# Patient Record
Sex: FEMALE | Race: WHITE | NOT HISPANIC OR LATINO | Employment: PART TIME | URBAN - NONMETROPOLITAN AREA
[De-identification: names, ages, dates, MRNs, and addresses within clinical notes are randomized per-mention and may not be internally consistent; named-entity substitution may affect disease eponyms.]

---

## 2024-08-09 ENCOUNTER — HOSPITAL ENCOUNTER (EMERGENCY)
Facility: HOSPITAL | Age: 34
Discharge: PSYCHIATRIC OR SUBSTANCE USE FACILITY | DRG: 897 | End: 2024-08-09
Attending: STUDENT IN AN ORGANIZED HEALTH CARE EDUCATION/TRAINING PROGRAM
Payer: MEDICAID

## 2024-08-09 ENCOUNTER — HOSPITAL ENCOUNTER (INPATIENT)
Facility: HOSPITAL | Age: 34
LOS: 5 days | Discharge: HOME | DRG: 897 | End: 2024-08-14
Attending: PSYCHIATRY & NEUROLOGY | Admitting: PSYCHIATRY & NEUROLOGY
Payer: MEDICAID

## 2024-08-09 VITALS
WEIGHT: 168 LBS | TEMPERATURE: 97.9 F | RESPIRATION RATE: 18 BRPM | SYSTOLIC BLOOD PRESSURE: 150 MMHG | OXYGEN SATURATION: 100 % | BODY MASS INDEX: 30.91 KG/M2 | DIASTOLIC BLOOD PRESSURE: 114 MMHG | HEIGHT: 62 IN | HEART RATE: 90 BPM

## 2024-08-09 DIAGNOSIS — F10.10 ALCOHOL ABUSE: Primary | ICD-10-CM

## 2024-08-09 PROBLEM — F19.20 CHEMICAL DEPENDENCY: Status: ACTIVE | Noted: 2024-08-09

## 2024-08-09 LAB
ALBUMIN SERPL-MCNC: 4.2 G/DL (ref 3.5–5.2)
ALBUMIN/GLOB SERPL: 1.2 G/DL
ALP SERPL-CCNC: 114 U/L (ref 39–117)
ALT SERPL W P-5'-P-CCNC: 22 U/L (ref 1–33)
AMPHET+METHAMPHET UR QL: NEGATIVE
AMPHETAMINES UR QL: NEGATIVE
ANION GAP SERPL CALCULATED.3IONS-SCNC: 16 MMOL/L (ref 5–15)
AST SERPL-CCNC: 61 U/L (ref 1–32)
B-HCG UR QL: NEGATIVE
BACTERIA UR QL AUTO: ABNORMAL /HPF
BARBITURATES UR QL SCN: NEGATIVE
BASOPHILS # BLD AUTO: 0.03 10*3/MM3 (ref 0–0.2)
BASOPHILS NFR BLD AUTO: 0.5 % (ref 0–1.5)
BENZODIAZ UR QL SCN: NEGATIVE
BILIRUB SERPL-MCNC: 0.8 MG/DL (ref 0–1.2)
BILIRUB UR QL STRIP: NEGATIVE
BUN SERPL-MCNC: 9 MG/DL (ref 6–20)
BUN/CREAT SERPL: 18.8 (ref 7–25)
BUPRENORPHINE SERPL-MCNC: POSITIVE NG/ML
CALCIUM SPEC-SCNC: 8.6 MG/DL (ref 8.6–10.5)
CANNABINOIDS SERPL QL: NEGATIVE
CHLORIDE SERPL-SCNC: 105 MMOL/L (ref 98–107)
CLARITY UR: CLEAR
CO2 SERPL-SCNC: 23 MMOL/L (ref 22–29)
COCAINE UR QL: NEGATIVE
COLOR UR: ABNORMAL
CREAT SERPL-MCNC: 0.48 MG/DL (ref 0.57–1)
DEPRECATED RDW RBC AUTO: 63.7 FL (ref 37–54)
EGFRCR SERPLBLD CKD-EPI 2021: 126.1 ML/MIN/1.73
EOSINOPHIL # BLD AUTO: 0.07 10*3/MM3 (ref 0–0.4)
EOSINOPHIL NFR BLD AUTO: 1.1 % (ref 0.3–6.2)
ERYTHROCYTE [DISTWIDTH] IN BLOOD BY AUTOMATED COUNT: 15 % (ref 12.3–15.4)
ETHANOL BLD-MCNC: 244 MG/DL (ref 0–10)
ETHANOL BLD-MCNC: 67 MG/DL (ref 0–10)
ETHANOL UR QL: 0.07 %
ETHANOL UR QL: 0.24 %
FENTANYL UR-MCNC: NEGATIVE NG/ML
GLOBULIN UR ELPH-MCNC: 3.6 GM/DL
GLUCOSE SERPL-MCNC: 118 MG/DL (ref 65–99)
GLUCOSE UR STRIP-MCNC: NEGATIVE MG/DL
HCT VFR BLD AUTO: 36 % (ref 34–46.6)
HGB BLD-MCNC: 12.6 G/DL (ref 12–15.9)
HGB UR QL STRIP.AUTO: NEGATIVE
HOLD SPECIMEN: NORMAL
HOLD SPECIMEN: NORMAL
HYALINE CASTS UR QL AUTO: ABNORMAL /LPF
IMM GRANULOCYTES # BLD AUTO: 0.01 10*3/MM3 (ref 0–0.05)
IMM GRANULOCYTES NFR BLD AUTO: 0.2 % (ref 0–0.5)
KETONES UR QL STRIP: NEGATIVE
LEUKOCYTE ESTERASE UR QL STRIP.AUTO: ABNORMAL
LYMPHOCYTES # BLD AUTO: 2.08 10*3/MM3 (ref 0.7–3.1)
LYMPHOCYTES NFR BLD AUTO: 33 % (ref 19.6–45.3)
MAGNESIUM SERPL-MCNC: 2.1 MG/DL (ref 1.6–2.6)
MCH RBC QN AUTO: 40 PG (ref 26.6–33)
MCHC RBC AUTO-ENTMCNC: 35 G/DL (ref 31.5–35.7)
MCV RBC AUTO: 114.3 FL (ref 79–97)
METHADONE UR QL SCN: NEGATIVE
MONOCYTES # BLD AUTO: 0.43 10*3/MM3 (ref 0.1–0.9)
MONOCYTES NFR BLD AUTO: 6.8 % (ref 5–12)
NEUTROPHILS NFR BLD AUTO: 3.69 10*3/MM3 (ref 1.7–7)
NEUTROPHILS NFR BLD AUTO: 58.4 % (ref 42.7–76)
NITRITE UR QL STRIP: NEGATIVE
NRBC BLD AUTO-RTO: 0 /100 WBC (ref 0–0.2)
OPIATES UR QL: NEGATIVE
OXYCODONE UR QL SCN: NEGATIVE
PCP UR QL SCN: NEGATIVE
PH UR STRIP.AUTO: 6 [PH] (ref 5–8)
PLATELET # BLD AUTO: 140 10*3/MM3 (ref 140–450)
PMV BLD AUTO: 10.7 FL (ref 6–12)
POTASSIUM SERPL-SCNC: 3.3 MMOL/L (ref 3.5–5.2)
PROT SERPL-MCNC: 7.8 G/DL (ref 6–8.5)
PROT UR QL STRIP: ABNORMAL
RBC # BLD AUTO: 3.15 10*6/MM3 (ref 3.77–5.28)
RBC # UR STRIP: ABNORMAL /HPF
REF LAB TEST METHOD: ABNORMAL
SODIUM SERPL-SCNC: 144 MMOL/L (ref 136–145)
SP GR UR STRIP: 1.02 (ref 1–1.03)
SQUAMOUS #/AREA URNS HPF: ABNORMAL /HPF
TRICYCLICS UR QL SCN: NEGATIVE
UROBILINOGEN UR QL STRIP: ABNORMAL
WBC # UR STRIP: ABNORMAL /HPF
WBC NRBC COR # BLD AUTO: 6.31 10*3/MM3 (ref 3.4–10.8)
WHOLE BLOOD HOLD COAG: NORMAL
WHOLE BLOOD HOLD SPECIMEN: NORMAL

## 2024-08-09 PROCEDURE — 83735 ASSAY OF MAGNESIUM: CPT | Performed by: PHYSICIAN ASSISTANT

## 2024-08-09 PROCEDURE — 36415 COLL VENOUS BLD VENIPUNCTURE: CPT

## 2024-08-09 PROCEDURE — 81001 URINALYSIS AUTO W/SCOPE: CPT | Performed by: PHYSICIAN ASSISTANT

## 2024-08-09 PROCEDURE — 87086 URINE CULTURE/COLONY COUNT: CPT | Performed by: PHYSICIAN ASSISTANT

## 2024-08-09 PROCEDURE — 80307 DRUG TEST PRSMV CHEM ANLYZR: CPT | Performed by: PHYSICIAN ASSISTANT

## 2024-08-09 PROCEDURE — 25010000002 THIAMINE HCL 200 MG/2ML SOLUTION: Performed by: PHYSICIAN ASSISTANT

## 2024-08-09 PROCEDURE — 25810000003 SODIUM CHLORIDE 0.9 % SOLUTION: Performed by: PHYSICIAN ASSISTANT

## 2024-08-09 PROCEDURE — 80053 COMPREHEN METABOLIC PANEL: CPT | Performed by: PHYSICIAN ASSISTANT

## 2024-08-09 PROCEDURE — 96375 TX/PRO/DX INJ NEW DRUG ADDON: CPT

## 2024-08-09 PROCEDURE — 87077 CULTURE AEROBIC IDENTIFY: CPT | Performed by: PHYSICIAN ASSISTANT

## 2024-08-09 PROCEDURE — 96365 THER/PROPH/DIAG IV INF INIT: CPT

## 2024-08-09 PROCEDURE — 87186 SC STD MICRODIL/AGAR DIL: CPT | Performed by: PHYSICIAN ASSISTANT

## 2024-08-09 PROCEDURE — 93005 ELECTROCARDIOGRAM TRACING: CPT | Performed by: PHYSICIAN ASSISTANT

## 2024-08-09 PROCEDURE — 99285 EMERGENCY DEPT VISIT HI MDM: CPT

## 2024-08-09 PROCEDURE — 82077 ASSAY SPEC XCP UR&BREATH IA: CPT | Performed by: PHYSICIAN ASSISTANT

## 2024-08-09 PROCEDURE — 85025 COMPLETE CBC W/AUTO DIFF WBC: CPT | Performed by: PHYSICIAN ASSISTANT

## 2024-08-09 PROCEDURE — 81025 URINE PREGNANCY TEST: CPT | Performed by: PHYSICIAN ASSISTANT

## 2024-08-09 PROCEDURE — 25010000002 ONDANSETRON PER 1 MG: Performed by: PHYSICIAN ASSISTANT

## 2024-08-09 RX ORDER — NICOTINE 21 MG/24HR
1 PATCH, TRANSDERMAL 24 HOURS TRANSDERMAL ONCE
Status: DISCONTINUED | OUTPATIENT
Start: 2024-08-09 | End: 2024-08-09 | Stop reason: HOSPADM

## 2024-08-09 RX ORDER — SODIUM CHLORIDE 9 MG/ML
1000 INJECTION, SOLUTION INTRAVENOUS ONCE
Status: COMPLETED | OUTPATIENT
Start: 2024-08-09 | End: 2024-08-09

## 2024-08-09 RX ORDER — LORAZEPAM 1 MG/1
1 TABLET ORAL
Status: ACTIVE | OUTPATIENT
Start: 2024-08-12 | End: 2024-08-13

## 2024-08-09 RX ORDER — LORAZEPAM 2 MG/1
2 TABLET ORAL ONCE
Status: COMPLETED | OUTPATIENT
Start: 2024-08-10 | End: 2024-08-10

## 2024-08-09 RX ORDER — TRAZODONE HYDROCHLORIDE 50 MG/1
50 TABLET ORAL NIGHTLY PRN
Status: DISCONTINUED | OUTPATIENT
Start: 2024-08-09 | End: 2024-08-14 | Stop reason: HOSPADM

## 2024-08-09 RX ORDER — ECHINACEA PURPUREA EXTRACT 125 MG
2 TABLET ORAL AS NEEDED
Status: DISCONTINUED | OUTPATIENT
Start: 2024-08-09 | End: 2024-08-14 | Stop reason: HOSPADM

## 2024-08-09 RX ORDER — FAMOTIDINE 20 MG/1
20 TABLET, FILM COATED ORAL 2 TIMES DAILY PRN
Status: DISCONTINUED | OUTPATIENT
Start: 2024-08-09 | End: 2024-08-14 | Stop reason: HOSPADM

## 2024-08-09 RX ORDER — LORAZEPAM 0.5 MG/1
0.5 TABLET ORAL
Status: COMPLETED | OUTPATIENT
Start: 2024-08-13 | End: 2024-08-13

## 2024-08-09 RX ORDER — LORAZEPAM 2 MG/1
2 TABLET ORAL
Status: ACTIVE | OUTPATIENT
Start: 2024-08-09 | End: 2024-08-10

## 2024-08-09 RX ORDER — MULTIVITAMIN WITH IRON
2 TABLET ORAL DAILY
Status: DISCONTINUED | OUTPATIENT
Start: 2024-08-10 | End: 2024-08-14 | Stop reason: HOSPADM

## 2024-08-09 RX ORDER — POLYETHYLENE GLYCOL 3350 17 G/17G
17 POWDER, FOR SOLUTION ORAL DAILY PRN
Status: DISCONTINUED | OUTPATIENT
Start: 2024-08-09 | End: 2024-08-14 | Stop reason: HOSPADM

## 2024-08-09 RX ORDER — CEFDINIR 300 MG/1
300 CAPSULE ORAL ONCE
Status: COMPLETED | OUTPATIENT
Start: 2024-08-09 | End: 2024-08-09

## 2024-08-09 RX ORDER — BUPRENORPHINE HYDROCHLORIDE AND NALOXONE HYDROCHLORIDE DIHYDRATE 8; 2 MG/1; MG/1
2 TABLET SUBLINGUAL DAILY
COMMUNITY

## 2024-08-09 RX ORDER — BENZTROPINE MESYLATE 1 MG/ML
1 INJECTION INTRAMUSCULAR; INTRAVENOUS ONCE AS NEEDED
Status: DISCONTINUED | OUTPATIENT
Start: 2024-08-09 | End: 2024-08-14 | Stop reason: HOSPADM

## 2024-08-09 RX ORDER — LORAZEPAM 0.5 MG/1
0.5 TABLET ORAL
Status: ACTIVE | OUTPATIENT
Start: 2024-08-13 | End: 2024-08-14

## 2024-08-09 RX ORDER — IBUPROFEN 400 MG/1
400 TABLET ORAL EVERY 6 HOURS PRN
Status: DISCONTINUED | OUTPATIENT
Start: 2024-08-09 | End: 2024-08-14 | Stop reason: HOSPADM

## 2024-08-09 RX ORDER — BENZONATATE 100 MG/1
100 CAPSULE ORAL 3 TIMES DAILY PRN
Status: DISCONTINUED | OUTPATIENT
Start: 2024-08-09 | End: 2024-08-14 | Stop reason: HOSPADM

## 2024-08-09 RX ORDER — LORAZEPAM 1 MG/1
1 TABLET ORAL
Status: COMPLETED | OUTPATIENT
Start: 2024-08-12 | End: 2024-08-12

## 2024-08-09 RX ORDER — LOPERAMIDE HYDROCHLORIDE 2 MG/1
2 CAPSULE ORAL
Status: DISCONTINUED | OUTPATIENT
Start: 2024-08-09 | End: 2024-08-14 | Stop reason: HOSPADM

## 2024-08-09 RX ORDER — MULTIPLE VITAMINS W/ MINERALS TAB 9MG-400MCG
1 TAB ORAL DAILY
Status: DISCONTINUED | OUTPATIENT
Start: 2024-08-10 | End: 2024-08-14 | Stop reason: HOSPADM

## 2024-08-09 RX ORDER — POTASSIUM CHLORIDE 20 MEQ/1
40 TABLET, EXTENDED RELEASE ORAL ONCE
Status: COMPLETED | OUTPATIENT
Start: 2024-08-09 | End: 2024-08-09

## 2024-08-09 RX ORDER — BENZTROPINE MESYLATE 1 MG/1
2 TABLET ORAL ONCE AS NEEDED
Status: DISCONTINUED | OUTPATIENT
Start: 2024-08-09 | End: 2024-08-14 | Stop reason: HOSPADM

## 2024-08-09 RX ORDER — SODIUM CHLORIDE 0.9 % (FLUSH) 0.9 %
10 SYRINGE (ML) INJECTION AS NEEDED
Status: DISCONTINUED | OUTPATIENT
Start: 2024-08-09 | End: 2024-08-09 | Stop reason: HOSPADM

## 2024-08-09 RX ORDER — ALUMINA, MAGNESIA, AND SIMETHICONE 2400; 2400; 240 MG/30ML; MG/30ML; MG/30ML
15 SUSPENSION ORAL EVERY 6 HOURS PRN
Status: DISCONTINUED | OUTPATIENT
Start: 2024-08-09 | End: 2024-08-14 | Stop reason: HOSPADM

## 2024-08-09 RX ORDER — HYDROXYZINE 50 MG/1
50 TABLET, FILM COATED ORAL EVERY 6 HOURS PRN
Status: DISCONTINUED | OUTPATIENT
Start: 2024-08-09 | End: 2024-08-14 | Stop reason: HOSPADM

## 2024-08-09 RX ORDER — LORAZEPAM 2 MG/1
2 TABLET ORAL
Status: ACTIVE | OUTPATIENT
Start: 2024-08-10 | End: 2024-08-11

## 2024-08-09 RX ORDER — ONDANSETRON 2 MG/ML
4 INJECTION INTRAMUSCULAR; INTRAVENOUS ONCE
Status: COMPLETED | OUTPATIENT
Start: 2024-08-09 | End: 2024-08-09

## 2024-08-09 RX ORDER — LORAZEPAM 2 MG/1
2 TABLET ORAL
Status: DISPENSED | OUTPATIENT
Start: 2024-08-10 | End: 2024-08-11

## 2024-08-09 RX ORDER — THIAMINE HYDROCHLORIDE 100 MG/ML
200 INJECTION, SOLUTION INTRAMUSCULAR; INTRAVENOUS ONCE
Status: COMPLETED | OUTPATIENT
Start: 2024-08-09 | End: 2024-08-09

## 2024-08-09 RX ORDER — ONDANSETRON 4 MG/1
4 TABLET, ORALLY DISINTEGRATING ORAL EVERY 6 HOURS PRN
Status: DISCONTINUED | OUTPATIENT
Start: 2024-08-09 | End: 2024-08-14 | Stop reason: HOSPADM

## 2024-08-09 RX ORDER — CEFDINIR 300 MG/1
300 CAPSULE ORAL EVERY 12 HOURS SCHEDULED
Status: DISCONTINUED | OUTPATIENT
Start: 2024-08-10 | End: 2024-08-10

## 2024-08-09 RX ADMIN — THIAMINE HYDROCHLORIDE 200 MG: 100 INJECTION, SOLUTION INTRAMUSCULAR; INTRAVENOUS at 15:11

## 2024-08-09 RX ADMIN — POTASSIUM CHLORIDE 40 MEQ: 1500 TABLET, EXTENDED RELEASE ORAL at 14:54

## 2024-08-09 RX ADMIN — CEFDINIR 300 MG: 300 CAPSULE ORAL at 23:29

## 2024-08-09 RX ADMIN — NICOTINE 1 PATCH: 21 PATCH TRANSDERMAL at 14:54

## 2024-08-09 RX ADMIN — SODIUM CHLORIDE 1000 ML: 9 INJECTION, SOLUTION INTRAVENOUS at 17:37

## 2024-08-09 RX ADMIN — SODIUM CHLORIDE 1000 ML: 9 INJECTION, SOLUTION INTRAVENOUS at 14:54

## 2024-08-09 RX ADMIN — FOLIC ACID 1 MG: 5 INJECTION, SOLUTION INTRAMUSCULAR; INTRAVENOUS; SUBCUTANEOUS at 15:10

## 2024-08-09 RX ADMIN — ONDANSETRON 4 MG: 2 INJECTION INTRAMUSCULAR; INTRAVENOUS at 19:24

## 2024-08-09 NOTE — NURSING NOTE
Pt's etoh came back 244. Spoke with Tuan De La Paz and lead nurse, Jessica. Pt will be moved to a medical bed when available.

## 2024-08-09 NOTE — NURSING NOTE
Pt states she has been drinking around 2 am this morning and there is also an odor of alcohol. Awaiting lab results to complete assessment.

## 2024-08-09 NOTE — ED PROVIDER NOTES
"Subjective   History of Present Illness  34-year-old female who presents to the ED today for a detox evaluation.  She states she recently got a DUI and the court recommended that she come here for treatment.  She states that she drinks about a fifth of alcohol per day.  Her last drink was around 2 AM.  She states she also uses 2 to Suboxone a day.  She states currently she feels shaky, nauseous and \"edgy.\"  She denies any suicidal ideations.    History provided by:  Patient  Drug / Alcohol Assessment  Primary symptoms include agitation. Primary symptoms comment: requesting detox. This is a new problem. The current episode started 6 to 12 hours ago. The problem has been gradually worsening. Suspected agents include alcohol and opiates. Associated symptoms include nausea. Associated medical issues include addiction treatment.       Review of Systems   Constitutional: Negative.    HENT: Negative.     Eyes: Negative.    Respiratory: Negative.     Cardiovascular: Negative.    Gastrointestinal:  Positive for nausea.   Genitourinary: Negative.    Musculoskeletal: Negative.    Skin: Negative.    Neurological:  Positive for tremors.   Psychiatric/Behavioral:  Positive for agitation. Negative for suicidal ideas.    All other systems reviewed and are negative.      Past Medical History:   Diagnosis Date    Alcohol abuse     Anxiety     Pancreatitis        No Known Allergies    Past Surgical History:   Procedure Laterality Date     SECTION      HERNIA REPAIR         Family History   Family history unknown: Yes       Social History     Socioeconomic History    Marital status: Single   Tobacco Use    Smoking status: Every Day     Current packs/day: 1.50     Average packs/day: 1.5 packs/day for 14.6 years (21.9 ttl pk-yrs)     Types: Cigarettes     Start date:     Smokeless tobacco: Never   Vaping Use    Vaping status: Some Days    Substances: Nicotine, Flavoring    Devices: Disposable, Pre-filled or refillable " cartridge, Refillable tank, Pre-filled pod   Substance and Sexual Activity    Alcohol use: Yes     Comment: drinks a fifth of vodka or more daily    Drug use: Not Currently     Comment: has taken suboxone for over 10 years    Sexual activity: Yes     Partners: Male           Objective   Physical Exam  Vitals and nursing note reviewed.   Constitutional:       General: She is not in acute distress.     Appearance: Normal appearance.   HENT:      Head: Normocephalic and atraumatic.      Right Ear: External ear normal.      Left Ear: External ear normal.      Nose: Nose normal.   Eyes:      Conjunctiva/sclera: Conjunctivae normal.      Pupils: Pupils are equal, round, and reactive to light.   Cardiovascular:      Rate and Rhythm: Normal rate and regular rhythm.      Pulses: Normal pulses.      Heart sounds: Normal heart sounds.   Pulmonary:      Effort: Pulmonary effort is normal.      Breath sounds: Normal breath sounds.   Abdominal:      General: Bowel sounds are normal.      Palpations: Abdomen is soft.   Musculoskeletal:         General: Normal range of motion.      Cervical back: Normal range of motion and neck supple.   Skin:     General: Skin is warm and dry.      Capillary Refill: Capillary refill takes less than 2 seconds.   Neurological:      General: No focal deficit present.      Mental Status: She is alert and oriented to person, place, and time.   Psychiatric:         Mood and Affect: Mood and affect normal.         Speech: Speech normal.         Behavior: Behavior normal. Behavior is cooperative.         Thought Content: Thought content does not include homicidal or suicidal ideation.         Procedures       Results for orders placed or performed during the hospital encounter of 08/09/24   Comprehensive Metabolic Panel    Specimen: Blood   Result Value Ref Range    Glucose 118 (H) 65 - 99 mg/dL    BUN 9 6 - 20 mg/dL    Creatinine 0.48 (L) 0.57 - 1.00 mg/dL    Sodium 144 136 - 145 mmol/L    Potassium 3.3  (L) 3.5 - 5.2 mmol/L    Chloride 105 98 - 107 mmol/L    CO2 23.0 22.0 - 29.0 mmol/L    Calcium 8.6 8.6 - 10.5 mg/dL    Total Protein 7.8 6.0 - 8.5 g/dL    Albumin 4.2 3.5 - 5.2 g/dL    ALT (SGPT) 22 1 - 33 U/L    AST (SGOT) 61 (H) 1 - 32 U/L    Alkaline Phosphatase 114 39 - 117 U/L    Total Bilirubin 0.8 0.0 - 1.2 mg/dL    Globulin 3.6 gm/dL    A/G Ratio 1.2 g/dL    BUN/Creatinine Ratio 18.8 7.0 - 25.0    Anion Gap 16.0 (H) 5.0 - 15.0 mmol/L    eGFR 126.1 >60.0 mL/min/1.73   Pregnancy, Urine - Urine, Clean Catch    Specimen: Urine, Clean Catch   Result Value Ref Range    HCG, Urine QL Negative Negative   Urinalysis With Microscopic If Indicated (No Culture) - Urine, Clean Catch    Specimen: Urine, Clean Catch   Result Value Ref Range    Color, UA Dark Yellow (A) Yellow, Straw    Appearance, UA Clear Clear    pH, UA 6.0 5.0 - 8.0    Specific Gravity, UA 1.023 1.005 - 1.030    Glucose, UA Negative Negative    Ketones, UA Negative Negative    Bilirubin, UA Negative Negative    Blood, UA Negative Negative    Protein, UA Trace (A) Negative    Leuk Esterase, UA Trace (A) Negative    Nitrite, UA Negative Negative    Urobilinogen, UA 1.0 E.U./dL 0.2 - 1.0 E.U./dL   Ethanol    Specimen: Blood   Result Value Ref Range    Ethanol 244 (H) 0 - 10 mg/dL    Ethanol % 0.244 %   Urine Drug Screen - Urine, Clean Catch    Specimen: Urine, Clean Catch   Result Value Ref Range    THC, Screen, Urine Negative Negative    Phencyclidine (PCP), Urine Negative Negative    Cocaine Screen, Urine Negative Negative    Methamphetamine, Ur Negative Negative    Opiate Screen Negative Negative    Amphetamine Screen, Urine Negative Negative    Benzodiazepine Screen, Urine Negative Negative    Tricyclic Antidepressants Screen Negative Negative    Methadone Screen, Urine Negative Negative    Barbiturates Screen, Urine Negative Negative    Oxycodone Screen, Urine Negative Negative    Buprenorphine, Screen, Urine Positive (A) Negative   Magnesium     Specimen: Blood   Result Value Ref Range    Magnesium 2.1 1.6 - 2.6 mg/dL   CBC Auto Differential    Specimen: Blood   Result Value Ref Range    WBC 6.31 3.40 - 10.80 10*3/mm3    RBC 3.15 (L) 3.77 - 5.28 10*6/mm3    Hemoglobin 12.6 12.0 - 15.9 g/dL    Hematocrit 36.0 34.0 - 46.6 %    .3 (H) 79.0 - 97.0 fL    MCH 40.0 (H) 26.6 - 33.0 pg    MCHC 35.0 31.5 - 35.7 g/dL    RDW 15.0 12.3 - 15.4 %    RDW-SD 63.7 (H) 37.0 - 54.0 fl    MPV 10.7 6.0 - 12.0 fL    Platelets 140 140 - 450 10*3/mm3    Neutrophil % 58.4 42.7 - 76.0 %    Lymphocyte % 33.0 19.6 - 45.3 %    Monocyte % 6.8 5.0 - 12.0 %    Eosinophil % 1.1 0.3 - 6.2 %    Basophil % 0.5 0.0 - 1.5 %    Immature Grans % 0.2 0.0 - 0.5 %    Neutrophils, Absolute 3.69 1.70 - 7.00 10*3/mm3    Lymphocytes, Absolute 2.08 0.70 - 3.10 10*3/mm3    Monocytes, Absolute 0.43 0.10 - 0.90 10*3/mm3    Eosinophils, Absolute 0.07 0.00 - 0.40 10*3/mm3    Basophils, Absolute 0.03 0.00 - 0.20 10*3/mm3    Immature Grans, Absolute 0.01 0.00 - 0.05 10*3/mm3    nRBC 0.0 0.0 - 0.2 /100 WBC   Fentanyl, Urine - Urine, Clean Catch    Specimen: Urine, Clean Catch   Result Value Ref Range    Fentanyl, Urine Negative Negative   Urinalysis, Microscopic Only - Urine, Clean Catch    Specimen: Urine, Clean Catch   Result Value Ref Range    RBC, UA 3-5 (A) None Seen, 0-2 /HPF    WBC, UA 3-5 (A) None Seen, 0-2 /HPF    Bacteria, UA 4+ (A) None Seen /HPF    Squamous Epithelial Cells, UA 0-2 None Seen, 0-2 /HPF    Hyaline Casts, UA None Seen None Seen /LPF    Methodology Automated Microscopy    Ethanol    Specimen: Blood   Result Value Ref Range    Ethanol 67 (H) 0 - 10 mg/dL    Ethanol % 0.067 %   ECG 12 Lead QT Measurement   Result Value Ref Range    QT Interval 450 ms    QTC Interval 506 ms   Green Top (Gel)   Result Value Ref Range    Extra Tube Hold for add-ons.    Lavender Top   Result Value Ref Range    Extra Tube hold for add-on    Gold Top - SST   Result Value Ref Range    Extra Tube Hold for  add-ons.    Light Blue Top   Result Value Ref Range    Extra Tube Hold for add-ons.           ED Course  ED Course as of 08/10/24 0134   Fri Aug 09, 2024   1421 Ethanol(!): 244 []   1937 Endorsed to Benedict Potts at shift change []   2005 Patient medically clear and stable for detox   [SARAH]   2324 Patient accepted to the [facility] per on-call psychiatrist via intake nurse []      ED Course User Index  [] Mary Iglesias PA  [SARAH] Ned Potts, APRN  [] Mariola Jones, APRN                                             Medical Decision Making  Problems Addressed:  Alcohol abuse: complicated acute illness or injury    Amount and/or Complexity of Data Reviewed  Labs: ordered. Decision-making details documented in ED Course.  ECG/medicine tests: ordered.    Risk  OTC drugs.  Prescription drug management.        Final diagnoses:   Alcohol abuse   Electronically signed by TRISTA Vega, 08/09/24, 5:44 PM EDT.     ED Disposition  ED Disposition       ED Disposition   DC/Transfer to Behavioral Health    [facility]   Stable    Comment   --               No follow-up provider specified.       Medication List      No changes were made to your prescriptions during this visit.            Mariola Jones, APRN  08/10/24 0134     prescriptions during this visit.            Mariola Jones, CONY  08/10/24 0134       Mariola Jones, CONY  08/13/24 1803

## 2024-08-10 LAB
HAV IGM SERPL QL IA: NORMAL
HBV CORE IGM SERPL QL IA: NORMAL
HBV SURFACE AG SERPL QL IA: NORMAL
HCV AB SER QL: NORMAL
QT INTERVAL: 450 MS
QTC INTERVAL: 506 MS
RETICS # AUTO: 0.09 10*6/MM3 (ref 0.02–0.13)
RETICS/RBC NFR AUTO: 3.57 % (ref 0.7–1.9)

## 2024-08-10 PROCEDURE — 82607 VITAMIN B-12: CPT | Performed by: PSYCHIATRY & NEUROLOGY

## 2024-08-10 PROCEDURE — 63710000001 ONDANSETRON ODT 4 MG TABLET DISPERSIBLE: Performed by: PSYCHIATRY & NEUROLOGY

## 2024-08-10 PROCEDURE — 99223 1ST HOSP IP/OBS HIGH 75: CPT | Performed by: PSYCHIATRY & NEUROLOGY

## 2024-08-10 PROCEDURE — 80074 ACUTE HEPATITIS PANEL: CPT | Performed by: PSYCHIATRY & NEUROLOGY

## 2024-08-10 PROCEDURE — HZ2ZZZZ DETOXIFICATION SERVICES FOR SUBSTANCE ABUSE TREATMENT: ICD-10-PCS | Performed by: PSYCHIATRY & NEUROLOGY

## 2024-08-10 PROCEDURE — 82746 ASSAY OF FOLIC ACID SERUM: CPT | Performed by: PSYCHIATRY & NEUROLOGY

## 2024-08-10 PROCEDURE — 85045 AUTOMATED RETICULOCYTE COUNT: CPT | Performed by: PSYCHIATRY & NEUROLOGY

## 2024-08-10 RX ORDER — BUPRENORPHINE HYDROCHLORIDE AND NALOXONE HYDROCHLORIDE DIHYDRATE 8; 2 MG/1; MG/1
2 TABLET SUBLINGUAL DAILY
Status: DISCONTINUED | OUTPATIENT
Start: 2024-08-10 | End: 2024-08-10

## 2024-08-10 RX ORDER — BUPRENORPHINE HYDROCHLORIDE AND NALOXONE HYDROCHLORIDE DIHYDRATE 8; 2 MG/1; MG/1
2 TABLET SUBLINGUAL DAILY
Status: DISCONTINUED | OUTPATIENT
Start: 2024-08-10 | End: 2024-08-14 | Stop reason: HOSPADM

## 2024-08-10 RX ADMIN — HYDROXYZINE HYDROCHLORIDE 50 MG: 50 TABLET ORAL at 00:11

## 2024-08-10 RX ADMIN — CEFDINIR 300 MG: 300 CAPSULE ORAL at 00:12

## 2024-08-10 RX ADMIN — ONDANSETRON 4 MG: 4 TABLET, ORALLY DISINTEGRATING ORAL at 21:08

## 2024-08-10 RX ADMIN — LORAZEPAM 2 MG: 2 TABLET ORAL at 08:33

## 2024-08-10 RX ADMIN — Medication 1 TABLET: at 08:33

## 2024-08-10 RX ADMIN — BUPRENORPHINE HYDROCHLORIDE AND NALOXONE HYDROCHLORIDE DIHYDRATE 2 TABLET: 8; 2 TABLET SUBLINGUAL at 08:33

## 2024-08-10 RX ADMIN — IBUPROFEN 400 MG: 400 TABLET, FILM COATED ORAL at 08:33

## 2024-08-10 RX ADMIN — HYDROXYZINE HYDROCHLORIDE 50 MG: 50 TABLET ORAL at 08:33

## 2024-08-10 RX ADMIN — TRAZODONE HYDROCHLORIDE 50 MG: 50 TABLET ORAL at 00:12

## 2024-08-10 RX ADMIN — Medication 2 TABLET: at 08:33

## 2024-08-10 RX ADMIN — LORAZEPAM 2 MG: 2 TABLET ORAL at 21:08

## 2024-08-10 RX ADMIN — Medication 100 MG: at 08:33

## 2024-08-10 RX ADMIN — LORAZEPAM 2 MG: 2 TABLET ORAL at 00:11

## 2024-08-10 RX ADMIN — TRAZODONE HYDROCHLORIDE 50 MG: 50 TABLET ORAL at 21:08

## 2024-08-10 RX ADMIN — ONDANSETRON 4 MG: 4 TABLET, ORALLY DISINTEGRATING ORAL at 08:33

## 2024-08-10 NOTE — NURSING NOTE
Spoke to Dr. Faust via phone. Intake information, labs, and vital sign provided. Instructed to admit sp4 to detox with routine orders.  Also instructed to give Ativan 2 mg stat and place on Ativan detox protocol. Advised to change prn Ativan to q 1 hour RBVOx2

## 2024-08-10 NOTE — NURSING NOTE
Spoke to Mariola DONNELLY, Instructed to provide stat order of omnicef 300 mg and to place pt on routine omnicef BID for 7 days.

## 2024-08-10 NOTE — NURSING NOTE
Upon entering detox unit patient had wedding ring on left finger. While assessing the patient it was determined the ring can't be removed do to her fingers being swollen. Spoke to Dr. Faust concerning pt ring; NURSING COMMUNICATION;NEW ORDER; pt allowed to keep ring on due to swelling of fingers.

## 2024-08-10 NOTE — NURSING NOTE
" Intake assessment completed at this time. Pt presents to Intake stating \"I want to detox from alcohol. I drink a half gallon of vodka daily for about a year\" Pt's last drink was earlier this afternoon before arrival. Pt reports taking prescribed suboxone for the last 10 years but denies any issues and does not want to detox from it. Pt states she has never been admitted for psych or detox before but has had outpatient substance use treatment.     Labs- UDS + for Buprenorphine    Creatinine 0.48  Potassium 3.3  AST 61  UA- 4+ bacteria    [id number]  depression0  [id number]  on scale of 0-10. Sleep-good  appetite- poor    Pt denies any SI/HI/AVH     CIWA- 9     Meds given- 1000 mL fluid bolus, Potassium 40 mEq, Zofran 4 mg     Pt A&Ox4  "

## 2024-08-10 NOTE — PLAN OF CARE
Goal Outcome Evaluation:  Plan of Care Reviewed With: patient  Patient Agreement with Plan of Care: agrees     Progress: no change  Outcome Evaluation: Pt new admit previous shift. Most of day spent in room in bed. Pt reported anxiety. Denies SI/HI/AVH. No distress noted

## 2024-08-10 NOTE — PROGRESS NOTES
"1000    DATA:      Therapist met individually with patient this date to introduce role and to discuss hospitalization expectations. Patient asleep and difficult to engage in assessment. Reviewed medical record and staffed case with treatment team this date. No major issues identified.       Clinical Maneuvering/Intervention:       Therapist initiated integrated summary, treatment plan, and initiated social history this date.  Therapist is strongly encouraging family involvement in treatment.      Therapist left education on CBT coping skills and relapse prevention at bedside.      ASSESSMENT:      The patient is a 34 year old  female. Per report, \"Intake assessment completed at this time. Pt presents to Intake stating \"I want to detox from alcohol. I drink a half gallon of vodka daily for about a year\" Pt's last drink was earlier this afternoon before arrival. Pt reports taking prescribed suboxone for the last 10 years but denies any issues and does not want to detox from it. Pt states she has never been admitted for psych or detox before but has had outpatient substance use treatment.Labs- UDS + for Buprenorphine.\"     Today, patient was seen 1-1 at bedside. Therapist attempted to wake patient this morning 3 x for assessment, but each time she was sleeping soundly.  No acute distress noted. Therapist initiated initial assessment using medical record.      1253: patient woke up briefly to smoke.  Patient calm, cooperative and oriented x 4. Therapist introduced self, provided support and gained consents. Patient was agreeable to involve her fiance Tevin Gilmore and to follow up with [facility] in [facility]     Therapist attempted contact with Tevin Gilmore at [phone]; no answer.         PLAN:       Patient to remain hospitalized this date.      Treatment team will focus efforts on stabilizing patient's acute symptoms while providing education on healthy coping and crisis management to reduce " hospitalizations.   Patient requires daily psychiatrist evaluation and 24/7 nursing supervision to promote patient  safety.     Therapist will offer 1-4 individual sessions, family education, and appropriate referral.     Therapist recommends continued evaluation.  Therapist will attempt follow up with patient; will continue assessment and recommend family involvement and residential [facility] referral. For today, [facility]. She reports that she will return home with her fimalcolm Abarca, or her father, at discharge.

## 2024-08-10 NOTE — H&P
INITIAL PSYCHIATRIC HISTORY & PHYSICAL    Patient Identification:  Name:  Emilie Greenberg  Age:  34 y.o.  Sex:  female  :  1990  MRN:  [id number]   Visit Number:  [id number]  Primary Care Physician:  Petros Ayala MD    SUBJECTIVE    CC/Focus of Exam: Detox    HPI: Emilie Greenberg is a 34 y.o. female who was admitted on 2024 with complaints of alcohol use and withdrawals. The patient reports a long history of substance use. First use was 16 years old. Over time the use increased and the patient  continued to use despite negative consequences including relationship problems, social and financial problems. The patient endorses symptoms of tolerance and withdrawals and ongoing cravings to use. Has tried to cut down and stop but has not been successful. Spends too much time and resources in pursuit of substance use. Longest period of sobriety is reported to be 4 months.  Currently using a gallon of vodka  Last use 2024  Withdrawal symptoms nausea, tremors, body aches, headaches, restlessness, chills, sweats  Patient denies any substance abuse.  Patient states that she is prescribed Suboxone.  Patient states that she uses tobacco.  Patient denies any history of seizures with withdrawal.  Patient states the death of her mother as the reason she relapsed.  Patient states the death of her mother as a stressor in her life.  The patient denies any history of physical, mental, or sexual abuse.  Patient rates her appetite as poor.  Patient rates her sleep as poor.  Patient states that she has nightmares.  Patient rates her anxiety on a scale of 1-10 with 10 being the most severe a 10.  Patient rates her depression on a scale of 1-10 with 10 being the most severe a 7.  Patient rates her cravings on a scale of 1-10 with 10 being the most severe a 10.  Patient's CIWA was 9.  Patient denies any suicidal ideation.  Patient denies any homicidal ideation.  Patient denies any hallucinations.  Patient was  admitted to [facility] for further safety and stabilization.    PAST PSYCHIATRIC HX: Patient has had no prior admissions  Dx: Chemical dependency  IP: Patient denies any  OP: Patient states through her Spiritism   Current meds: Patient denies any  Previous meds: Zoloft, and Abilify  SH/SI/SA: Patient denies any  Trauma: Patient denies any    SUBSTANCE USE HX: UDS was positive for buprenorphine.  See HPI for current use.      SOCIAL HX: Patient states she was born in [facility].  Patient states that she was raised in [place].  Patient states she currently resides with her fiancé and their children in [place].  Patient states that she is  but currently .  Patient states they are going through a divorce.  Patient states that she is currently employed with [employer].  Patient states she has some college education.  Patient denies any legal issues.      FAMILY HX: History of depression and anxiety on father's side of family    Family History   Family history unknown: Yes       Past Medical History:   Diagnosis Date    Alcohol abuse     Anxiety     Pancreatitis        Past Surgical History:   Procedure Laterality Date     SECTION      HERNIA REPAIR         Medications Prior to Admission   Medication Sig Dispense Refill Last Dose    buprenorphine-naloxone (SUBOXONE) 8-2 MG per SL tablet Place 2 tablets under the tongue Daily.   2024 at 0600         ALLERGIES:  Patient has no known allergies.    Temp:  [97.3 °F (36.3 °C)-98.5 °F (36.9 °C)] 98.5 °F (36.9 °C)  Heart Rate:  [] 60  Resp:  [16-18] 16  BP: (105-175)/() 124/79    REVIEW OF SYSTEMS:  Review of Systems   Constitutional:  Positive for appetite change, chills and fatigue.   Gastrointestinal:  Positive for nausea.   Musculoskeletal:  Positive for myalgias.   Neurological:  Positive for tremors.   Psychiatric/Behavioral:  Positive for dysphoric mood and sleep disturbance. The  patient is nervous/anxious.    All other systems reviewed and are negative.       OBJECTIVE    PHYSICAL EXAM:  Physical Exam  Vitals and nursing note reviewed.   Constitutional:       Appearance: She is well-developed.   HENT:      Head: Normocephalic and atraumatic.      Right Ear: External ear normal.      Left Ear: External ear normal.      Nose: Nose normal.   Eyes:      Pupils: Pupils are equal, round, and reactive to light.   Pulmonary:      Effort: Pulmonary effort is normal. No respiratory distress.      Breath sounds: Normal breath sounds.   Abdominal:      General: There is no distension.      Palpations: Abdomen is soft.   Musculoskeletal:         General: No deformity. Normal range of motion.      Cervical back: Normal range of motion and neck supple.   Skin:     General: Skin is warm.      Findings: No rash.   Neurological:      Mental Status: She is alert and oriented to person, place, and time.      Coordination: Coordination normal.         MENTAL STATUS EXAM:   Hygiene:   fair  Cooperation:  Cooperative  Eye Contact:  Good  Psychomotor Behavior:  Appropriate  Affect:  Appropriate  Hopelessness: 5  Speech:  Normal  Thought Process: Linear  Thought Content:  Normal  Suicidal:  None  Homicidal:  None  Hallucinations:  None  Delusion:  None  Memory:  Intact  Orientation:  Person, Place, Time, and Situation  Reliability:  fair  Insight:  Fair  Judgment:  Poor  Impulse Control:  Poor      Imaging Results (Last 24 Hours)       ** No results found for the last 24 hours. **             Lab Results   Component Value Date    GLUCOSE 118 (H) 08/09/2024    BUN 9 08/09/2024    CREATININE 0.48 (L) 08/09/2024    BCR 18.8 08/09/2024    CO2 23.0 08/09/2024    CALCIUM 8.6 08/09/2024    ALBUMIN 4.2 08/09/2024    AST 61 (H) 08/09/2024    ALT 22 08/09/2024       Lab Results   Component Value Date    WBC 6.31 08/09/2024    HGB 12.6 08/09/2024    HCT 36.0 08/09/2024    .3 (H) 08/09/2024     08/09/2024        ECG/EMG Results (most recent)       None             Brief Urine Lab Results  (Last result in the past 365 days)        Color   Clarity   Blood   Leuk Est   Nitrite   Protein   CREAT   Urine HCG        08/09/24 1356 Dark Yellow   Clear   Negative   Trace   Negative   Trace           08/09/24 1356               Negative               Last Urine Toxicity          Latest Ref Rng & Units 8/9/2024   LAST URINE TOXICITY RESULTS   Amphetamine, Urine Qual Negative Negative    Barbiturates Screen, Urine Negative Negative    Benzodiazepine Screen, Urine Negative Negative    Buprenorphine, Screen, Urine Negative Positive    Cocaine Screen, Urine Negative Negative    Fentanyl, Urine Negative Negative    Methadone Screen , Urine Negative Negative    Methamphetamine, Ur Negative Negative       Details                   Chart, notes, vitals, labs personally reviewed.  Glucose 118, AST 61, .3  Outside VANDANA report requested, reviewed, regularly prescribed Suboxone 16 mg daily  UDS results: + Buprenorphine  Ethanol 244 on arrival to the ED  EKG tracing personally reviewed, interpreted as normal sinus rhythm, prolonged QTc at 506  Consulted with patient's therapist regarding clinical history and treatment plan    ASSESSMENT & PLAN:    Alcohol use disorder, severe, dependence, in withdrawal  -Admitted for medically assisted detox  -Begin Ativan detox protocol  -Supplementary vitamins and comfort meds ordered  -We will encourage rehab or other intensive substance abuse treatment following discharge    Opioid use disorder, severe, dependence, on maintenance therapy  -Admission UDS positive for buprenorphine  -Continue Suboxone 16 mg daily  -We will refer for substance abuse treatment following hospitalization    Prolonged Qtc  -506 on admission EKG  -We will monitor meds and obtain another EKG    Microcytosis  -.3  -Order vitamin B12 level    Hospital bed: No    The patient has been admitted for safety and  stabilization.  Patient will be monitored for suicidality daily and maintained on Special Precautions Level 4 (q30 min checks).  The patient will have individual and group therapy with a master's level therapist. A master treatment plan will be developed and agreed upon by the patient and his/her treatment team.  The patient's estimated length of stay in the hospital is 5-7 days.     This note was generated using a scribe, Angy Summers.  The work documented in this note was completed, reviewed, and approved by the attending psychiatrist as designated Dr. Alton Chairez electronic signature.

## 2024-08-10 NOTE — PLAN OF CARE
Goal Outcome Evaluation:  Plan of Care Reviewed With: patient  Patient Agreement with Plan of Care: agrees        Outcome Evaluation: Care plan initiated

## 2024-08-10 NOTE — PLAN OF CARE
Problem: Adult Behavioral Health Plan of Care  Goal: Plan of Care Review  Outcome: Ongoing, Progressing  Flowsheets (Taken 8/10/2024 1301)  Consent Given to Review Plan with: Prosper Gilmore  Progress: no change  Plan of Care Reviewed With:   patient   significant other  Patient Agreement with Plan of Care: agrees  Outcome Evaluation: New admit. Initiated social history and care planning  Goal: Patient-Specific Goal (Individualization)  Outcome: Ongoing, Progressing  Flowsheets  Taken 8/10/2024 1301  Patient-Specific Goals (Include Timeframe): Patient will deny acute alcohol withdrawal symptoms in 1-7 days.  Patient will identify 1-2 healthy coping skills to use as a part of alcohol relapse prevention in 1-7 days.  Patient will consent to aftercare referral with [facility] in 1-7 days  Individualized Care Needs: Therapist will offer 1-4 individual sessions focusing on CBT coping skills and relapse prevention prior to discharge. Therapist will offer family education prior to discharge. Therapist will offer aftercare involving [facility] treatment prior to discharge.  Anxieties, Fears or Concerns: none reported  Taken 8/10/2024 1250  Patient Personal Strengths:   expressive of needs   expressive of emotions   motivated for treatment   realistic evaluation of current/future capabilities   resilient   resourceful   spiritual/Jain support   positive vocational history   positive educational history   community support   family/social support  Patient Vulnerabilities:   substance abuse/addiction   poor impulse control  Goal: Optimized Coping Skills in Response to Life Stressors  Outcome: Ongoing, Progressing  Goal: Develops/Participates in Therapeutic [place] to Support Successful Transition  Outcome: Ongoing, Progressing  Intervention: Foster Therapeutic [place]  Flowsheets (Taken 8/10/2024 1301)  Trust Relationship/Rapport:   care explained   questions encouraged   reassurance provided    emotional support provided   choices provided   thoughts/feelings acknowledged   empathic listening provided   questions answered  Intervention: Mutually Develop Transition Plan  Flowsheets  Taken 8/10/2024 1301  Outpatient/Agency/Support Group Needs: outpatient substance abuse treatment (specify)  Transition Support:   community resources reviewed   follow-up care coordinated   follow-up care discussed   crisis management plan promoted   crisis management plan verbalized  Anticipated Discharge Disposition: home with family  Taken 8/10/2024 1259  Discharge Coordination/Progress: Patient appears to lack insurance at this time. She may benefit from [facility] assessment. Patient signed consent for [facility].  Concerns Comments: NA  Transportation Anticipated: family or friend will provide  Transportation Concerns: no car  Current Discharge Risk: substance use/abuse  Concerns to be Addressed:   substance/tobacco abuse/use   coping/stress   discharge planning  Readmission Within the Last 30 Days: no previous admission in last 30 days  Patient/Family Anticipated Services at Transition:   outpatient care   mental health services   rehabilitation services  [facility]  Patient/Family Anticipates Transition to: home with family  Offered/Gave Vendor List: no   Goal Outcome Evaluation:  Plan of Care Reviewed With: patient, significant other  Patient Agreement with Plan of Care: agrees  Consent Given to Review Plan with: Prosper Gilmore  Progress: no change  Outcome Evaluation: New admit. Initiated social history and care planning

## 2024-08-10 NOTE — NURSING NOTE
Spoke to Dr. Faust, instructed to continue order for omnicef 300 mg BID for 7 days given by Mariola DONNELLY

## 2024-08-11 LAB
FOLATE SERPL-MCNC: 3.04 NG/ML (ref 4.78–24.2)
QT INTERVAL: 492 MS
QTC INTERVAL: 466 MS
VIT B12 BLD-MCNC: 440 PG/ML (ref 211–946)

## 2024-08-11 PROCEDURE — 93010 ELECTROCARDIOGRAM REPORT: CPT | Performed by: INTERNAL MEDICINE

## 2024-08-11 PROCEDURE — 99232 SBSQ HOSP IP/OBS MODERATE 35: CPT | Performed by: PSYCHIATRY & NEUROLOGY

## 2024-08-11 PROCEDURE — 93005 ELECTROCARDIOGRAM TRACING: CPT | Performed by: PSYCHIATRY & NEUROLOGY

## 2024-08-11 PROCEDURE — 63710000001 ONDANSETRON ODT 4 MG TABLET DISPERSIBLE: Performed by: PSYCHIATRY & NEUROLOGY

## 2024-08-11 RX ORDER — SULFAMETHOXAZOLE AND TRIMETHOPRIM 800; 160 MG/1; MG/1
1 TABLET ORAL EVERY 12 HOURS SCHEDULED
Status: COMPLETED | OUTPATIENT
Start: 2024-08-11 | End: 2024-08-13

## 2024-08-11 RX ADMIN — TRAZODONE HYDROCHLORIDE 50 MG: 50 TABLET ORAL at 21:33

## 2024-08-11 RX ADMIN — Medication 2 TABLET: at 08:11

## 2024-08-11 RX ADMIN — Medication 1 TABLET: at 08:11

## 2024-08-11 RX ADMIN — BUPRENORPHINE HYDROCHLORIDE AND NALOXONE HYDROCHLORIDE DIHYDRATE 2 TABLET: 8; 2 TABLET SUBLINGUAL at 08:11

## 2024-08-11 RX ADMIN — SULFAMETHOXAZOLE AND TRIMETHOPRIM 1 TABLET: 800; 160 TABLET ORAL at 21:33

## 2024-08-11 RX ADMIN — LORAZEPAM 1.5 MG: 1 TABLET ORAL at 21:33

## 2024-08-11 RX ADMIN — Medication 100 MG: at 08:11

## 2024-08-11 RX ADMIN — HYDROXYZINE HYDROCHLORIDE 50 MG: 50 TABLET ORAL at 17:23

## 2024-08-11 RX ADMIN — LORAZEPAM 1.5 MG: 1 TABLET ORAL at 08:12

## 2024-08-11 RX ADMIN — ONDANSETRON 4 MG: 4 TABLET, ORALLY DISINTEGRATING ORAL at 08:14

## 2024-08-11 RX ADMIN — LORAZEPAM 1.5 MG: 1 TABLET ORAL at 14:19

## 2024-08-11 RX ADMIN — SULFAMETHOXAZOLE AND TRIMETHOPRIM 1 TABLET: 800; 160 TABLET ORAL at 11:54

## 2024-08-11 NOTE — PROGRESS NOTES
"INPATIENT PSYCHIATRIC PROGRESS NOTE    Name:  Emilie Greenberg  :  1990  MRN:  [id number]  Visit Number:  [id number]  Length of stay:  2    SUBJECTIVE    CC/Focus of Exam: Alcohol and opioid dependence    INTERVAL HISTORY:  Patient still experienced significant withdrawal symptoms.  She reports anxiety, insomnia, restlessness, skin crawling, occasional jerking movements, nausea and decreased appetite.  Tolerating medication thus far.  Appears ill today.    Depression rating 10  Anxiety rating 7/10  Sleep: Fair, 5 hours  Withdrawal sx: Per HPI  Craving: 3/10    Review of Systems   Constitutional:  Positive for appetite change and chills.   Respiratory: Negative.     Cardiovascular: Negative.    Gastrointestinal:  Positive for nausea.   Musculoskeletal:  Positive for myalgias.   Neurological:  Positive for tremors.   Psychiatric/Behavioral:  Positive for dysphoric mood and sleep disturbance. The patient is nervous/anxious.        OBJECTIVE    Temp:  [97 °F (36.1 °C)-98.1 °F (36.7 °C)] 97.5 °F (36.4 °C)  Heart Rate:  [58-75] 75  Resp:  [16-18] 18  BP: ()/(52-90) 158/90    MENTAL STATUS EXAM:  Appearance: Casually dressed, fair hygeine.   Cooperation: Cooperative  Psychomotor: No psychomotor agitation/retardation, No EPS, No motor tics  Speech: normal rate, amount.  Mood: \"Not good\"   Affect: congruent, anxious  Thought Content: goal directed, no delusional material present  Thought process: linear, organized.  Suicidality: No SI  Homicidality: No HI  Perception: No AH/VH  Insight: fair   Judgment: fair    Lab Results (last 24 hours)       Procedure Component Value Units Date/Time    Reticulocytes [[id number]]  (Abnormal) Collected: 08/10/24 1724    Specimen: Blood Updated: 08/10/24 1815     Reticulocyte % 3.57 %      Reticulocyte Absolute 0.0946 10*6/mm3     Vitamin B12 [[id number]] Collected: 08/10/24 1724    Specimen: Blood Updated: 08/10/24 1809    Folate [[id number]] Collected: 08/10/24 1724    Specimen: " Blood Updated: 08/10/24 1809               Imaging Results (Last 24 Hours)       ** No results found for the last 24 hours. **               ECG/EMG Results (most recent)       None             ALLERGIES: Patient has no known allergies.      Current Facility-Administered Medications:     aluminum-magnesium hydroxide-simethicone (MAALOX MAX) 400-400-40 MG/5ML suspension 15 mL, 15 mL, Oral, Q6H PRN, Rojelio Faust MD    B-complex with vitamin C tablet 2 tablet, 2 tablet, Oral, Daily, Rojelio Faust MD, 2 tablet at 24 0811    benzonatate (TESSALON) capsule 100 mg, 100 mg, Oral, TID PRN, Rojelio Faust MD    benztropine (COGENTIN) tablet 2 mg, 2 mg, Oral, Once PRN **OR** benztropine (COGENTIN) injection 1 mg, 1 mg, Intramuscular, Once PRN, Rojelio Faust MD    buprenorphine-naloxone (SUBOXONE) 8-2 MG per SL tablet 2 tablet, 2 tablet, Sublingual, Daily, Martina Faust MD, 2 tablet at 24 0811    famotidine (PEPCID) tablet 20 mg, 20 mg, Oral, BID PRN, Rojelio Faust MD    hydrOXYzine (ATARAX) tablet 50 mg, 50 mg, Oral, Q6H PRN, Rojelio Faust MD, 50 mg at 08/10/24 0833    ibuprofen (ADVIL,MOTRIN) tablet 400 mg, 400 mg, Oral, Q6H PRN, Rojelio Faust MD, 400 mg at 08/10/24 0833    loperamide (IMODIUM) capsule 2 mg, 2 mg, Oral, Q2H PRN, Rojelio Faust MD    [] LORazepam (ATIVAN) tablet 2 mg, 2 mg, Oral, 3 times per day, 2 mg at 08/10/24 2108 **FOLLOWED BY** LORazepam (ATIVAN) tablet 1.5 mg, 1.5 mg, Oral, 3 times per day, 1.5 mg at 24 0812 **FOLLOWED BY** [START ON 2024] LORazepam (ATIVAN) tablet 1 mg, 1 mg, Oral, 3 times per day **FOLLOWED BY** [START ON 2024] LORazepam (ATIVAN) tablet 0.5 mg, 0.5 mg, Oral, 3 times per day, Rojelio Faust MD    [] LORazepam (ATIVAN) tablet 2 mg, 2 mg, Oral, Q1H PRN **FOLLOWED BY** LORazepam (ATIVAN) tablet 1.5 mg, 1.5 mg, Oral, Q1H PRN **FOLLOWED BY** [START ON 2024] LORazepam (ATIVAN) tablet 1 mg, 1 mg, Oral, Q1H PRN **FOLLOWED BY**  [START ON 8/13/2024] LORazepam (ATIVAN) tablet 0.5 mg, 0.5 mg, Oral, Q1H PRN, Rojelio Faust MD    magnesium hydroxide (MILK OF MAGNESIA) suspension 10 mL, 10 mL, Oral, Daily PRN, Rojelio Faust MD    multivitamin with minerals 1 tablet, 1 tablet, Oral, Daily, Rojelio Faust MD, 1 tablet at 08/11/24 0811    ondansetron ODT (ZOFRAN-ODT) disintegrating tablet 4 mg, 4 mg, Translingual, Q6H PRN, Rojelio Faust MD, 4 mg at 08/11/24 0814    polyethylene glycol (MIRALAX) packet 17 g, 17 g, Oral, Daily PRN, Rojelio Faust MD    sodium chloride nasal spray 2 spray, 2 spray, Each Nare, PRN, Rojelio Faust MD    thiamine (VITAMIN B-1) tablet 100 mg, 100 mg, Oral, Daily, Rojelio Faust MD, 100 mg at 08/11/24 0811    traZODone (DESYREL) tablet 50 mg, 50 mg, Oral, Nightly PRN, Rojelio Faust MD, 50 mg at 08/10/24 2108    Reviewed chart, notes, vitals, labs and EKG personally reviewed.    ASSESSMENT & PLAN:      Alcohol use disorder, severe, dependence, in withdrawal  -Admitted for medically assisted detox  -Continue Ativan detox protocol  -Supplementary vitamins and comfort meds ordered  -We will encourage rehab or other intensive substance abuse treatment following discharge     Opioid use disorder, severe, dependence, on maintenance therapy  -Admission UDS positive for buprenorphine  -Continue Suboxone 16 mg daily  -We will refer for substance abuse treatment following hospitalization     Prolonged Qtc  -506 on admission EKG  -Repeat EKG     Microcytosis  -.3  -Pending vitamin B12, folate level  -Reviewed reticulocyte, percentage increased to 3.57, absolute normal.  -Patient not anemic or symptomatic at this time, so can be deferred to PCP     Urinary tract infection  -Urine culture growing gram-negative bacilli  -Begin Bactrim DS twice daily for 3 days    Hospital bed: No     The patient has been admitted for safety and stabilization.  Patient will be monitored for suicidality daily and maintained on Special Precautions  Level 4 (q30 min checks).  The patient will have individual and group therapy with a master's level therapist. A master treatment plan will be developed and agreed upon by the patient and his/her treatment team.  The patient's estimated length of stay in the hospital is 3-5 days.     Special precautions: Special Precautions Level 3 (q15 min checks)     Behavioral Health Treatment Plan and Problem List: I have reviewed and approved the Behavioral Health Treatment Plan and Problem list.  The patient has had a chance to review and agrees with the treatment plan.    I have reviewed the copied text and it is accurate as of 08/11/24     Clinician:  Alton Chairez MD  08/11/24  09:56 EDT

## 2024-08-11 NOTE — PLAN OF CARE
Goal Outcome Evaluation:  Plan of Care Reviewed With: patient  Patient Agreement with Plan of Care: agrees     Progress: no change  Outcome Evaluation: Pt calm and cooperative this shift. Most of shift spent in room in bed. Pt reported nausea, irritability, and headache. Denies SI/HI/AVH. No distress noted.

## 2024-08-11 NOTE — PAYOR COMM NOTE
"THIS IS A NOTIFICATION OF ADMISSION, INITIAL REQUEST FOR COVERAGE, RETURN FAX [phone]    FACILITY NPI # [id number]  [facility]  [address] [facility]  [phone]     MD: MIMI SANCHEZ NPI # [id number]  [address] [facility] [address]  [phone]     UR CONTACT: PEPE SOSA, RN  [phone]  [phone]      Emilie Posada (34 y.o. Female)       Date of Birth   1990    Social Security Number       Address   [address] [place] [id number]    Home Phone   [phone]    MRN   [id number]       Alevism   [facility]    Marital Status   Single                            Admission Date   8/9/24    Admission Type   Emergency    Admitting Provider   Rojelio Faust MD    Attending Provider   Rojelio Faust MD    Department, Room/Bed   [facility] ADULT CD, 1039/2S       Discharge Date       Discharge Disposition       Discharge Destination                                 Attending Provider: Rojelio Faust MD    Allergies: No Known Allergies    Isolation: None   Infection: None   Code Status: CPR    Ht: 157.5 cm (62\")   Wt: 75.9 kg (167 lb 6.4 oz)    Admission Cmt: None   Principal Problem: Chemical dependency [F19.20]                   Active Insurance as of 8/9/2024       Primary Coverage       Payor Plan Insurance Group Employer/Plan Group    [place] [id number]       Payor Plan Address Payor Plan Phone Number Payor Plan Fax Number Effective Dates    [employer] [address] [address] [phone]  6/1/2024 - None Entered    [facility] [address]         Subscriber Name Subscriber Birth Date Member ID       EMILIE POSADA 1990 [id number]                     Emergency Contacts        (Rel.) Home Phone Work Phone Mobile Phone    NICOLÁS PIKE (Significant Other) [phone] -- --              Problem List             Codes Noted - Resolved       Hospital    * (Principal) Chemical dependency ICD-10-CM: F19.20  ICD-9-CM: 304.90 " 2024 - Present        History & Physical        Alton Chairez MD at 08/10/24 1114                INITIAL PSYCHIATRIC HISTORY & PHYSICAL    Patient Identification:  Name:  Emilie Greenberg  Age:  34 y.o.  Sex:  female  :  1990  MRN:  [id number]   Visit Number:  [id number]  Primary Care Physician:  Petros Ayala MD    SUBJECTIVE    CC/Focus of Exam: Detox    HPI: Emilie Greenberg is a 34 y.o. female who was admitted on 2024 with complaints of alcohol use and withdrawals. The patient reports a long history of substance use. First use was 16 years old. Over time the use increased and the patient  continued to use despite negative consequences including relationship problems, social and financial problems. The patient endorses symptoms of tolerance and withdrawals and ongoing cravings to use. Has tried to cut down and stop but has not been successful. Spends too much time and resources in pursuit of substance use. Longest period of sobriety is reported to be 4 months.  Currently using a gallon of vodka  Last use 2024  Withdrawal symptoms nausea, tremors, body aches, headaches, restlessness, chills, sweats  Patient denies any substance abuse.  Patient states that she is prescribed Suboxone.  Patient states that she uses tobacco.  Patient denies any history of seizures with withdrawal.  Patient states the death of her mother as the reason she relapsed.  Patient states the death of her mother as a stressor in her life.  The patient denies any history of physical, mental, or sexual abuse.  Patient rates her appetite as poor.  Patient rates her sleep as poor.  Patient states that she has nightmares.  Patient rates her anxiety on a scale of 1-10 with 10 being the most severe a 10.  Patient rates her depression on a scale of 1-10 with 10 being the most severe a 7.  Patient rates her cravings on a scale of 1-10 with 10 being the most severe a 10.  Patient's CIWA was 9.  Patient denies any suicidal  ideation.  Patient denies any homicidal ideation.  Patient denies any hallucinations.  Patient was admitted to [facility] for further safety and stabilization.    PAST PSYCHIATRIC HX: Patient has had no prior admissions  Dx: Chemical dependency  IP: Patient denies any  OP: Patient states through her [facility]   Current meds: Patient denies any  Previous meds: Zoloft, and Abilify  SH/SI/SA: Patient denies any  Trauma: Patient denies any    SUBSTANCE USE HX: UDS was positive for buprenorphine.  See HPI for current use.      SOCIAL HX: Patient states she was born in [facility].  Patient states that she was raised in [facility] [place] [facility].  Patient states she currently resides with her fiancé and their children in [place].  Patient states that she is  but currently .  Patient states they are going through a divorce.  Patient states that she is currently employed with [employer].  Patient states she has some college education.  Patient denies any legal issues.      FAMILY HX: History of depression and anxiety on father's side of family    Family History   Family history unknown: Yes       Past Medical History:   Diagnosis Date    Alcohol abuse     Anxiety     Pancreatitis        Past Surgical History:   Procedure Laterality Date     SECTION      HERNIA REPAIR         Medications Prior to Admission   Medication Sig Dispense Refill Last Dose    buprenorphine-naloxone (SUBOXONE) 8-2 MG per SL tablet Place 2 tablets under the tongue Daily.   2024 at 0600         ALLERGIES:  Patient has no known allergies.    Temp:  [97.3 °F (36.3 °C)-98.5 °F (36.9 °C)] 98.5 °F (36.9 °C)  Heart Rate:  [] 60  Resp:  [16-18] 16  BP: (105-175)/() 124/79    REVIEW OF SYSTEMS:  Review of Systems   Constitutional:  Positive for appetite change, chills and fatigue.   Gastrointestinal:  Positive for nausea.   Musculoskeletal:  Positive for myalgias.   Neurological:   Positive for tremors.   Psychiatric/Behavioral:  Positive for dysphoric mood and sleep disturbance. The patient is nervous/anxious.    All other systems reviewed and are negative.       OBJECTIVE    PHYSICAL EXAM:  Physical Exam  Vitals and nursing note reviewed.   Constitutional:       Appearance: She is well-developed.   HENT:      Head: Normocephalic and atraumatic.      Right Ear: External ear normal.      Left Ear: External ear normal.      Nose: Nose normal.   Eyes:      Pupils: Pupils are equal, round, and reactive to light.   Pulmonary:      Effort: Pulmonary effort is normal. No respiratory distress.      Breath sounds: Normal breath sounds.   Abdominal:      General: There is no distension.      Palpations: Abdomen is soft.   Musculoskeletal:         General: No deformity. Normal range of motion.      Cervical back: Normal range of motion and neck supple.   Skin:     General: Skin is warm.      Findings: No rash.   Neurological:      Mental Status: She is alert and oriented to person, place, and time.      Coordination: Coordination normal.         MENTAL STATUS EXAM:   Hygiene:   fair  Cooperation:  Cooperative  Eye Contact:  Good  Psychomotor Behavior:  Appropriate  Affect:  Appropriate  Hopelessness: 5  Speech:  Normal  Thought Process: Linear  Thought Content:  Normal  Suicidal:  None  Homicidal:  None  Hallucinations:  None  Delusion:  None  Memory:  Intact  Orientation:  Person, Place, Time, and Situation  Reliability:  fair  Insight:  Fair  Judgment:  Poor  Impulse Control:  Poor      Imaging Results (Last 24 Hours)       ** No results found for the last 24 hours. **             Lab Results   Component Value Date    GLUCOSE 118 (H) 08/09/2024    BUN 9 08/09/2024    CREATININE 0.48 (L) 08/09/2024    BCR 18.8 08/09/2024    CO2 23.0 08/09/2024    CALCIUM 8.6 08/09/2024    ALBUMIN 4.2 08/09/2024    AST 61 (H) 08/09/2024    ALT 22 08/09/2024       Lab Results   Component Value Date    WBC 6.31 08/09/2024     HGB 12.6 08/09/2024    HCT 36.0 08/09/2024    .3 (H) 08/09/2024     08/09/2024       ECG/EMG Results (most recent)       None             Brief Urine Lab Results  (Last result in the past 365 days)        Color   Clarity   Blood   Leuk Est   Nitrite   Protein   CREAT   Urine HCG        08/09/24 1356 Dark Yellow   Clear   Negative   Trace   Negative   Trace           08/09/24 1356               Negative               Last Urine Toxicity          Latest Ref Rng & Units 8/9/2024   LAST URINE TOXICITY RESULTS   Amphetamine, Urine Qual Negative Negative    Barbiturates Screen, Urine Negative Negative    Benzodiazepine Screen, Urine Negative Negative    Buprenorphine, Screen, Urine Negative Positive    Cocaine Screen, Urine Negative Negative    Fentanyl, Urine Negative Negative    Methadone Screen , Urine Negative Negative    Methamphetamine, Ur Negative Negative       Details                   Chart, notes, vitals, labs personally reviewed.  Glucose 118, AST 61, .3  Outside [facility] report requested, reviewed, regularly prescribed Suboxone 16 mg daily  UDS results: + Buprenorphine  Ethanol 244 on arrival to the ED  EKG tracing personally reviewed, interpreted as normal sinus rhythm, prolonged QTc at 506  Consulted with patient's therapist regarding clinical history and treatment plan    ASSESSMENT & PLAN:    Alcohol use disorder, severe, dependence, in withdrawal  -Admitted for medically assisted detox  -Begin Ativan detox protocol  -Supplementary vitamins and comfort meds ordered  -We will encourage rehab or other intensive substance abuse treatment following discharge    Opioid use disorder, severe, dependence, on maintenance therapy  -Admission UDS positive for buprenorphine  -Continue Suboxone 16 mg daily  -We will refer for substance abuse treatment following hospitalization    Prolonged Qtc  -506 on admission EKG  -We will monitor meds and obtain another EKG    Microcytosis  -.3  -Order  vitamin B12 level    Hospital bed: No    The patient has been admitted for safety and stabilization.  Patient will be monitored for suicidality daily and maintained on Special Precautions Level 4 (q30 min checks).  The patient will have individual and group therapy with a master's level therapist. A master treatment plan will be developed and agreed upon by the patient and his/her treatment team.  The patient's estimated length of stay in the hospital is 5-7 days.     This note was generated using a scribeAngy.  The work documented in this note was completed, reviewed, and approved by the attending psychiatrist as designated Dr. Alton Chairez electronic signature.     Electronically signed by Alton Chairez MD at 08/10/24 1220       Vital Signs (last 7 days)       Date/Time Temp Temp src Pulse Resp BP Patient Position SpO2    08/11/24 1400 97.5 (36.4) Temporal 61 16 129/81 Sitting 96    08/11/24 0911 -- -- 60 18 103/59 Sitting 99    08/11/24 0800 97.5 (36.4) Temporal 75 18 158/90 Sitting 98    08/11/24 0230 98.1 (36.7) Temporal 69 16 95/56 Lying 96    08/10/24 2050 97.9 (36.6) Temporal 66 16 124/82 Lying 98    08/10/24 1400 97 (36.1) Temporal 58 16 91/52 Lying 97    08/10/24 0800 97.7 (36.5) Temporal 72 18 125/81 Sitting 96    08/10/24 0400 98.5 (36.9) Temporal 60 16 124/79 Lying 96    08/10/24 0111 97.3 (36.3) Temporal 71 16 117/73 Lying 96    08/10/24 0010 97.7 (36.5) Temporal 87 18 175/112 Sitting 97          CIWA (since admission)        Date/Time CIWA-Ar Score    08/11/24 0919 3     08/10/24 2000 4     08/10/24 0915 3     08/10/24 0111 8     08/10/24 0011 9                   COWS (since admission)        None                  Facility-Administered Medications as of 8/11/2024   Medication Dose Route Frequency Provider Last Rate Last Admin    aluminum-magnesium hydroxide-simethicone (MAALOX MAX) 400-400-40 MG/5ML suspension 15 mL  15 mL Oral Q6H PRN Rojelio Faust MD        B-complex with vitamin C  tablet 2 tablet  2 tablet Oral Daily Rojelio Faust MD   2 tablet at 24 0811    benzonatate (TESSALON) capsule 100 mg  100 mg Oral TID PRN Rojelio Faust MD        benztropine (COGENTIN) tablet 2 mg  2 mg Oral Once PRN Rojelio Faust MD        Or    benztropine (COGENTIN) injection 1 mg  1 mg Intramuscular Once PRN Rojelio Faust MD        buprenorphine-naloxone (SUBOXONE) 8-2 MG per SL tablet 2 tablet  2 tablet Sublingual Daily Martina Faust MD   2 tablet at 24 0811    [COMPLETED] cefdinir (OMNICEF) capsule 300 mg  300 mg Oral Once Mariola Jones, [place]   300 mg at 24 2329    famotidine (PEPCID) tablet 20 mg  20 mg Oral BID PRN Rojelio Faust MD        [COMPLETED] folic acid 1 mg in sodium chloride 0.9 % 50 mL IVPB  1 mg Intravenous Once Mary Iglesias PA   Stopped at 24 1540    hydrOXYzine (ATARAX) tablet 50 mg  50 mg Oral Q6H PRN Rojelio Faust MD   50 mg at 08/10/24 0833    ibuprofen (ADVIL,MOTRIN) tablet 400 mg  400 mg Oral Q6H PRN Rojelio Faust MD   400 mg at 08/10/24 0833    loperamide (IMODIUM) capsule 2 mg  2 mg Oral Q2H PRN Rojelio Faust MD        [] LORazepam (ATIVAN) tablet 2 mg  2 mg Oral 3 times per day Rojelio Faust MD   2 mg at 08/10/24 210    Followed by    LORazepam (ATIVAN) tablet 1.5 mg  1.5 mg Oral 3 times per day Rojelio Faust MD   1.5 mg at 24 1419    Followed by    [START ON 2024] LORazepam (ATIVAN) tablet 1 mg  1 mg Oral 3 times per day Rojelio Faust MD        Followed by    [START ON 2024] LORazepam (ATIVAN) tablet 0.5 mg  0.5 mg Oral 3 times per day Rojelio Faust MD        [] LORazepam (ATIVAN) tablet 2 mg  2 mg Oral Q1H PRN Rojelio Faust MD        Followed by    LORazepam (ATIVAN) tablet 1.5 mg  1.5 mg Oral Q1H PRN Rojelio Faust MD        Followed by    [START ON 2024] LORazepam (ATIVAN) tablet 1 mg  1 mg Oral Q1H PRN Rojelio Faust MD        Followed by    [START ON 2024] LORazepam (ATIVAN) tablet 0.5  mg  0.5 mg Oral Q1H PRN Rojelio Faust MD        [] LORazepam (ATIVAN) tablet 2 mg  2 mg Oral Q2H PRN Rojelio Faust MD        [COMPLETED] LORazepam (ATIVAN) tablet 2 mg  2 mg Oral Once Rojelio Faust MD   2 mg at 08/10/24 0011    magnesium hydroxide (MILK OF MAGNESIA) suspension 10 mL  10 mL Oral Daily PRN Rojelio Faust MD        multivitamin with minerals 1 tablet  1 tablet Oral Daily Rojelio Faust MD   1 tablet at 24 0811    [COMPLETED] ondansetron (ZOFRAN) injection 4 mg  4 mg Intravenous Once Iglesias, Mary PAGAN PA   4 mg at 24 1924    ondansetron ODT (ZOFRAN-ODT) disintegrating tablet 4 mg  4 mg Translingual Q6H PRN Rojelio Faust MD   4 mg at 24 0814    polyethylene glycol (MIRALAX) packet 17 g  17 g Oral Daily PRN Rojelio Faust MD        [COMPLETED] potassium chloride (KLOR-CON M20) CR tablet 40 mEq  40 mEq Oral Once Iglesias, Mary PAGAN PA   40 mEq at 24 1454    [COMPLETED] sodium chloride 0.9 % bolus 1,000 mL  1,000 mL Intravenous Once Iglesias, Mary PAGAN, PA   Stopped at 24 1807    [COMPLETED] sodium chloride 0.9 % infusion 1,000 mL  1,000 mL Intravenous Once Iglesias, Mary PAGAN, PA   Stopped at 24 1713    sodium chloride nasal spray 2 spray  2 spray Each Nare PRN Rojelio Faust MD        sulfamethoxazole-trimethoprim (BACTRIM DS,SEPTRA DS) 800-160 MG per tablet 1 tablet  1 tablet Oral Q12H Alton Chairez MD   1 tablet at 24 1154    [COMPLETED] thiamine (B-1) injection 200 mg  200 mg Intravenous Once Iglesias, Mary PAGAN PA   200 mg at 24 1511    thiamine (VITAMIN B-1) tablet 100 mg  100 mg Oral Daily Rojelio Faust MD   100 mg at 24 0811    traZODone (DESYREL) tablet 50 mg  50 mg Oral Nightly PRN Rojelio Faust MD   50 mg at 08/10/24 8331     Lab Results (all)       Procedure Component Value Units Date/Time    Vitamin B12 [[id number]]  (Normal) Collected: 08/10/24 1724    Specimen: Blood Updated: 24 1243     Vitamin B-12 440 pg/mL     Narrative:      Results may be  "falsely increased if patient taking Biotin.      Folate [[id number]]  (Abnormal) Collected: 08/10/24 1724    Specimen: Blood Updated: 08/11/24 1243     Folate 3.04 ng/mL     Narrative:      Results may be falsely increased if patient taking Biotin.      Reticulocytes [[id number]]  (Abnormal) Collected: 08/10/24 1724    Specimen: Blood Updated: 08/10/24 1815     Reticulocyte % 3.57 %      Reticulocyte Absolute 0.0946 10*6/mm3     Hepatitis Panel, Acute [[id number]]  (Normal) Collected: 08/10/24 0440    Specimen: Blood Updated: 08/10/24 0511     Hepatitis B Surface Ag Non-Reactive     Hep A IgM Non-Reactive     Hep B C IgM Non-Reactive     Hepatitis C Ab Non-Reactive    Narrative:      Results may be falsely decreased if patient taking Biotin.           Orders (all)        Start     Ordered    08/13/24 0800  LORazepam (ATIVAN) tablet 0.5 mg  Every 1 Hour PRN        Placed in \"Followed by\" Linked Group    08/09/24 2345 08/13/24 0000  LORazepam (ATIVAN) tablet 0.5 mg  3 times per day        Placed in \"Followed by\" Linked Group    08/09/24 2345    08/12/24 0800  LORazepam (ATIVAN) tablet 1 mg  Every 1 Hour PRN        Placed in \"Followed by\" Linked Group    08/09/24 2345    08/12/24 0000  LORazepam (ATIVAN) tablet 1 mg  3 times per day        Placed in \"Followed by\" Linked Group    08/09/24 2345    08/11/24 1100  sulfamethoxazole-trimethoprim (BACTRIM DS,SEPTRA DS) 800-160 MG per tablet 1 tablet  Every 12 Hours Scheduled         08/11/24 1002    08/11/24 1003  ECG 12 Lead QT Measurement  Once         08/11/24 1002    08/11/24 0800  LORazepam (ATIVAN) tablet 1.5 mg  Every 1 Hour PRN        Placed in \"Followed by\" Linked Group    08/09/24 2345    08/11/24 0000  LORazepam (ATIVAN) tablet 1.5 mg  3 times per day        Placed in \"Followed by\" Linked Group    08/09/24 2345    08/10/24 1220  Reticulocytes  Once         08/10/24 1220    08/10/24 1220  Vitamin B12  Once         08/10/24 1220    08/10/24 1220  Folate  Once         " "08/10/24 1220    08/10/24 0930  buprenorphine-naloxone (SUBOXONE) 8-2 MG per SL tablet 2 tablet  Daily         08/10/24 0831    08/10/24 0904  Diet: Regular/House; Fluid Consistency: Thin (IDDSI 0)  Diet Effective Now         08/10/24 0904    08/10/24 0900  B-complex with vitamin C tablet 2 tablet  Daily         08/09/24 2346    08/10/24 0900  thiamine (VITAMIN B-1) tablet 100 mg  Daily         08/09/24 2346    08/10/24 0900  multivitamin with minerals 1 tablet  Daily         08/09/24 2346    08/10/24 0900  buprenorphine-naloxone (SUBOXONE) 8-2 MG per SL tablet 2 tablet  Daily,   Status:  Discontinued         08/10/24 0059    08/10/24 0800  LORazepam (ATIVAN) tablet 2 mg  3 times per day        Placed in \"Followed by\" Linked Group    08/09/24 2345    08/10/24 0800  LORazepam (ATIVAN) tablet 2 mg  Every 1 Hour PRN        Placed in \"Followed by\" Linked Group    08/09/24 2345    08/10/24 0135  Patient has wedding ring on left hand that is swollen therefore we can't remove it. Spoke to Dr. Faust concerning ring; NURSING COMMUNICATION;NEW ORDER; pt allowed to keep ring on.  Nursing Communication  Continuous        Comments: Patient has wedding ring on left hand that is swollen therefore we can't remove it. Spoke to Dr. Faust concerning ring; NURSING COMMUNICATION;NEW ORDER; pt allowed to keep ring on.    08/10/24 0134    08/10/24 0127  Patient has wedding ring on left hand that is swollen therefore we can't remove it. Spoke to Dr. Faust concerning ring; NEW ORDER; pt allowed to keep ring on.  Nursing Communication  Continuous,   Status:  Canceled        Comments: Patient has wedding ring on left hand that is swollen therefore we can't remove it. Spoke to Dr. Faust concerning ring; NEW ORDER; pt allowed to keep ring on.    08/10/24 0128    08/10/24 0045  cefdinir (OMNICEF) capsule 300 mg  Every 12 Hours Scheduled,   Status:  Discontinued         08/09/24 2350    08/10/24 0030  LORazepam (ATIVAN) tablet 2 mg  Once  " "       08/09/24 2345    08/09/24 2347  Diet: Regular/House; Fluid Consistency: Thin (IDDSI 0)  Diet Effective Now,   Status:  Canceled         08/09/24 2346    08/09/24 2346  Vital Signs Per Hospital Policy  Per Hospital Policy         08/09/24 2346    08/09/24 2346  Activity as Tolerated - Up Ad Peggy  Until Discontinued         08/09/24 2346    08/09/24 2346  Weigh Patient on Admission  Once         08/09/24 2346    08/09/24 2346  Program Staff to Assign Patient to Appropriate Group  Continuous         08/09/24 2346    08/09/24 2346  Psychotherapy - Individual  Once         08/09/24 2346    08/09/24 2346  Psychotherapy - Family  Once         08/09/24 2346    08/09/24 2346  Legal Status Voluntary - Have Patient Sign Treatment Form  Continuous         08/09/24 2346    08/09/24 2346  Special Precautions Level 4  Continuous        Comments: Monitor Withdrawal Symptoms & Over All Well-Being of Patient    08/09/24 2346    08/09/24 2346  Hepatitis Panel, Acute  Once         08/09/24 2346    08/09/24 2345  ibuprofen (ADVIL,MOTRIN) tablet 400 mg  Every 6 Hours PRN         08/09/24 2346    08/09/24 2345  hydrOXYzine (ATARAX) tablet 50 mg  Every 6 Hours PRN         08/09/24 2346    08/09/24 2345  traZODone (DESYREL) tablet 50 mg  Nightly PRN         08/09/24 2346    08/09/24 2345  magnesium hydroxide (MILK OF MAGNESIA) suspension 10 mL  Daily PRN         08/09/24 2346    08/09/24 2345  loperamide (IMODIUM) capsule 2 mg  Every 2 Hours PRN         08/09/24 2346    08/09/24 2345  polyethylene glycol (MIRALAX) packet 17 g  Daily PRN         08/09/24 2346    08/09/24 2345  aluminum-magnesium hydroxide-simethicone (MAALOX MAX) 400-400-40 MG/5ML suspension 15 mL  Every 6 Hours PRN         08/09/24 2346    08/09/24 2345  famotidine (PEPCID) tablet 20 mg  2 Times Daily PRN         08/09/24 2346    08/09/24 2345  benztropine (COGENTIN) tablet 2 mg  Once As Needed        Placed in \"Or\" Linked Group    08/09/24 2346    08/09/24 2345  " "benztropine (COGENTIN) injection 1 mg  Once As Needed        Placed in \"Or\" Linked Group    08/09/24 2346    08/09/24 2345  benzonatate (TESSALON) capsule 100 mg  3 Times Daily PRN         08/09/24 2346    08/09/24 2345  sodium chloride nasal spray 2 spray  As Needed         08/09/24 2346    08/09/24 2345  ondansetron ODT (ZOFRAN-ODT) disintegrating tablet 4 mg  Every 6 Hours PRN         08/09/24 2346    08/09/24 2342  Initiate Protocol: Lorazepam (Ativan) Oral Detoxification  Once         08/09/24 2345    08/09/24 2342  If Patient is Demonstrating Distress / Withdrawal Symptoms 4 Hours After Lorazepam Scheduled Dose Administration, Evaluate patient by Completing Clinical [place] Withrawal Assessment and Vital Signs.  Every Shift       08/09/24 2345    08/09/24 2342  Clinical [place] Withdrawal Assessment - Every Shift  Every Shift       08/09/24 2345    08/09/24 2342  Check Patient's Mouth After Administration to Verify Medication is Swallowed  Every Shift       08/09/24 2345    08/09/24 2342  Notify Provider - Detox Protocol  Continuous        Comments: Open Order Report to View Parameters Requiring Provider Notification    08/09/24 2345    08/09/24 2341  LORazepam (ATIVAN) tablet 2 mg  Every 2 Hours PRN         08/09/24 2345    08/09/24 2341  Admit To Psychiatry  Once         08/09/24 2345    08/09/24 2341  Code Status and Medical Interventions: CPR (Attempt to Resuscitate); Full Support  Continuous         08/09/24 2345    08/09/24 2341  Initiate Detox Admission Orders  Continuous         08/09/24 2345    Unscheduled  Clinical [place] Withdrawal Assessment - As Needed  As Needed       08/09/24 2345    Unscheduled  Assess Patient for Signs / Symptoms of Withdrawal (Motor Restlessness, Vomiting, Tremor, Elevated Heart Rate & Blood Pressure, Insomnia, Fever, Hallucinations & Disorientation)  As Needed       08/09/24 2345    Unscheduled  If PRN Dose of Lorazepam is Required, Repeat Clinical [place] " "Withdrawal Assessment and Vital Signs 1 Hour After Dose and Report to Provider  As Needed       24 2345    Unscheduled  May Use Chap Stick As Needed  As Needed       24 2346                     Physician Progress Notes (all)        Alton Chairez MD at 24 0956          INPATIENT PSYCHIATRIC PROGRESS NOTE    Name:  Emilie Greenberg  :  1990  MRN:  [id number]  Visit Number:  [id number]  Length of stay:  2    SUBJECTIVE    CC/Focus of Exam: Alcohol and opioid dependence    INTERVAL HISTORY:  Patient still experienced significant withdrawal symptoms.  She reports anxiety, insomnia, restlessness, skin crawling, occasional jerking movements, nausea and decreased appetite.  Tolerating medication thus far.  Appears ill today.    Depression rating 10  Anxiety rating 7/10  Sleep: Fair, 5 hours  Withdrawal sx: Per HPI  Craving: 3/10    Review of Systems   Constitutional:  Positive for appetite change and chills.   Respiratory: Negative.     Cardiovascular: Negative.    Gastrointestinal:  Positive for nausea.   Musculoskeletal:  Positive for myalgias.   Neurological:  Positive for tremors.   Psychiatric/Behavioral:  Positive for dysphoric mood and sleep disturbance. The patient is nervous/anxious.        OBJECTIVE    Temp:  [97 °F (36.1 °C)-98.1 °F (36.7 °C)] 97.5 °F (36.4 °C)  Heart Rate:  [58-75] 75  Resp:  [16-18] 18  BP: ()/(52-90) 158/90    MENTAL STATUS EXAM:  Appearance: Casually dressed, fair hygeine.   Cooperation: Cooperative  Psychomotor: No psychomotor agitation/retardation, No EPS, No motor tics  Speech: normal rate, amount.  Mood: \"Not good\"   Affect: congruent, anxious  Thought Content: goal directed, no delusional material present  Thought process: linear, organized.  Suicidality: No SI  Homicidality: No HI  Perception: No AH/VH  Insight: fair   Judgment: fair    Lab Results (last 24 hours)       Procedure Component Value Units Date/Time    Reticulocytes [[id number]]  (Abnormal) " Collected: 08/10/24 1724    Specimen: Blood Updated: 08/10/24 1815     Reticulocyte % 3.57 %      Reticulocyte Absolute 0.0946 10*6/mm3     Vitamin B12 [[id number]] Collected: 08/10/24 1724    Specimen: Blood Updated: 08/10/24 1809    Folate [[id number]] Collected: 08/10/24 1724    Specimen: Blood Updated: 08/10/24 1809               Imaging Results (Last 24 Hours)       ** No results found for the last 24 hours. **               ECG/EMG Results (most recent)       None             ALLERGIES: Patient has no known allergies.      Current Facility-Administered Medications:     aluminum-magnesium hydroxide-simethicone (MAALOX MAX) 400-400-40 MG/5ML suspension 15 mL, 15 mL, Oral, Q6H PRN, Rojelio Faust MD    B-complex with vitamin C tablet 2 tablet, 2 tablet, Oral, Daily, Rojelio Faust MD, 2 tablet at 24 08    benzonatate (TESSALON) capsule 100 mg, 100 mg, Oral, TID PRN, Rojelio Faust MD    benztropine (COGENTIN) tablet 2 mg, 2 mg, Oral, Once PRN **OR** benztropine (COGENTIN) injection 1 mg, 1 mg, Intramuscular, Once PRN, Rojelio Faust MD    buprenorphine-naloxone (SUBOXONE) 8-2 MG per SL tablet 2 tablet, 2 tablet, Sublingual, Daily, Martina Faust MD, 2 tablet at 24 08    famotidine (PEPCID) tablet 20 mg, 20 mg, Oral, BID PRN, Rojelio Faust MD    hydrOXYzine (ATARAX) tablet 50 mg, 50 mg, Oral, Q6H PRN, Rojelio Faust MD, 50 mg at 08/10/24 08    ibuprofen (ADVIL,MOTRIN) tablet 400 mg, 400 mg, Oral, Q6H PRN, oRjelio Faust MD, 400 mg at 08/10/24 0833    loperamide (IMODIUM) capsule 2 mg, 2 mg, Oral, Q2H PRN, Rojelio Faust MD    [] LORazepam (ATIVAN) tablet 2 mg, 2 mg, Oral, 3 times per day, 2 mg at 08/10/24 2108 **FOLLOWED BY** LORazepam (ATIVAN) tablet 1.5 mg, 1.5 mg, Oral, 3 times per day, 1.5 mg at 24 0812 **FOLLOWED BY** [START ON 2024] LORazepam (ATIVAN) tablet 1 mg, 1 mg, Oral, 3 times per day **FOLLOWED BY** [START ON 2024] LORazepam (ATIVAN) tablet 0.5 mg, 0.5  mg, Oral, 3 times per day, Rojelio Faust MD    [] LORazepam (ATIVAN) tablet 2 mg, 2 mg, Oral, Q1H PRN **FOLLOWED BY** LORazepam (ATIVAN) tablet 1.5 mg, 1.5 mg, Oral, Q1H PRN **FOLLOWED BY** [START ON 2024] LORazepam (ATIVAN) tablet 1 mg, 1 mg, Oral, Q1H PRN **FOLLOWED BY** [START ON 2024] LORazepam (ATIVAN) tablet 0.5 mg, 0.5 mg, Oral, Q1H PRN, Rojelio Faust MD    magnesium hydroxide (MILK OF MAGNESIA) suspension 10 mL, 10 mL, Oral, Daily PRN, Rjoelio Faust MD    multivitamin with minerals 1 tablet, 1 tablet, Oral, Daily, Rojelio Faust MD, 1 tablet at 24 0811    ondansetron ODT (ZOFRAN-ODT) disintegrating tablet 4 mg, 4 mg, Translingual, Q6H PRN, Rojelio Faust MD, 4 mg at 24 0814    polyethylene glycol (MIRALAX) packet 17 g, 17 g, Oral, Daily PRN, Rojelio Faust MD    sodium chloride nasal spray 2 spray, 2 spray, Each Nare, PRN, Rojelio Faust MD    thiamine (VITAMIN B-1) tablet 100 mg, 100 mg, Oral, Daily, Rojelio Faust MD, 100 mg at 24 0811    traZODone (DESYREL) tablet 50 mg, 50 mg, Oral, Nightly PRN, Rojelio Faust MD, 50 mg at 08/10/24 2108    Reviewed chart, notes, vitals, labs and EKG personally reviewed.    ASSESSMENT & PLAN:      Alcohol use disorder, severe, dependence, in withdrawal  -Admitted for medically assisted detox  -Continue Ativan detox protocol  -Supplementary vitamins and comfort meds ordered  -We will encourage rehab or other intensive substance abuse treatment following discharge     Opioid use disorder, severe, dependence, on maintenance therapy  -Admission UDS positive for buprenorphine  -Continue Suboxone 16 mg daily  -We will refer for substance abuse treatment following hospitalization     Prolonged Qtc  -506 on admission EKG  -Repeat EKG     Microcytosis  -.3  -Pending vitamin B12, folate level  -Reviewed reticulocyte, percentage increased to 3.57, absolute normal.  -Patient not anemic or symptomatic at this time, so can be deferred to  PCP     Urinary tract infection  -Urine culture growing gram-negative bacilli  -Begin Bactrim DS twice daily for 3 days    Hospital bed: No     The patient has been admitted for safety and stabilization.  Patient will be monitored for suicidality daily and maintained on Special Precautions Level 4 (q30 min checks).  The patient will have individual and group therapy with a master's level therapist. A master treatment plan will be developed and agreed upon by the patient and his/her treatment team.  The patient's estimated length of stay in the hospital is 3-5 days.     Special precautions: Special Precautions Level 3 (q15 min checks)     Behavioral Health Treatment Plan and Problem List: I have reviewed and approved the Behavioral Health Treatment Plan and Problem list.  The patient has had a chance to review and agrees with the treatment plan.    I have reviewed the copied text and it is accurate as of 08/11/24     Clinician:  Alton Chairez MD  08/11/24  09:56 EDT      Electronically signed by Alton Chairez MD at 08/11/24 1001       Nursing Assessments (Last 168 Hours)       Adult  PCS Body System       Row Name 08/11/24 1655 08/11/24 0919 08/11/24 0811 08/11/24 0600 08/11/24 0200       Pain/Comfort/Sleep    Preferred Pain Scale -- number (Numeric Rating Pain Scale) -- -- --       Sleep/Rest    Sleep/Rest/Relaxation -- difficulty falling asleep -- -- --       Coping/Psychosocial    Verbalized Emotional State -- anxiety -- -- --    Plan of Care Reviewed With -- patient -- -- --    Patient Agreement with Plan of Care -- agrees -- -- --       Coping/Psychosocial Interventions    Supportive Measures -- active listening utilized -- -- --       HEENT    HEENT WDL -- WDL -- -- --       Mouth/Teeth WDL    Mouth/Teeth WDL -- WDL -- -- --       Cognitive/Neuro/Behavioral WDL    Cognitive/Neuro/Behavioral WDL -- WDL;all -- -- --    Arousal Level -- opens eyes spontaneously -- -- --    Orientation -- oriented x 4 -- --  --    Speech -- clear;spontaneous;logical -- -- --    Mood/Behavior -- cooperative;behavior appropriate to situation;calm -- -- --       CIWA-Ar (Alcohol Withdrawal Assessment)    Nausea and Vomiting -- 1-->mild nausea with no vomiting -- -- --    Tremor -- 1-->not visible, but can be felt fingertip to fingertip -- -- --    Paroxysmal Sweats -- 0-->no sweat visible -- -- --    Anxiety -- 1-->mildly anxious -- -- --    Agitation -- 0-->normal activity -- -- --    Tactile Disturbances -- 0-->none -- -- --    Auditory Disturbances -- 0-->not present -- -- --    Visual Disturbances -- 0-->not present -- -- --    Headache, Fullness in Head -- 0-->not present -- -- --    Orientation and Clouding of Sensorium -- 0-->oriented and can do serial additions -- -- --    CIWA-Ar Score -- 3 -- -- --       Neuro    Additional Documentation [place] Coma Scale (Group) [place] Coma Scale (Group) -- [place] Coma Scale (Group) [place] Coma Scale (Group)       [place] Coma Scale    Best Eye Response 4-->(E4) spontaneous 4-->(E4) spontaneous -- 4-->(E4) spontaneous 4-->(E4) spontaneous    Best Motor Response 6-->(M6) obeys commands 6-->(M6) obeys commands -- 6-->(M6) obeys commands 6-->(M6) obeys commands    Best Verbal Response 5-->(V5) oriented 5-->(V5) oriented -- 5-->(V5) oriented 5-->(V5) oriented    [place] Coma Scale Score 15 15 -- 15 15    Assessment Qualifiers -- -- -- patient not sedated/intubated patient not sedated/intubated       Behavioral    General Appearance WDL -- .WDL except;appearance -- -- --    General Appearance -- unkempt -- -- --       Behavior WDL    Behavior WDL -- WDL -- -- --       Emotion Mood WDL    Emotion/Mood/Affect WDL -- .WDL except;all -- -- --    Emotion/Mood -- anxious;melancholic -- -- --    Patient rated anxiety level -- 8 -- -- --    Patient rated depression level -- 5 -- -- --       Speech WDL    Speech WDL -- WDL -- -- --       Perceptual State WDL    Perceptual State WDL -- WDL -- -- --        Thought Process WDL    Thought Process WDL -- .WDL except;judgment and insight -- -- --    Judgment and Insight -- insight not appropriate to situation;judgment not appropriate to situation -- -- --       Intellectual Performance WDL    Intellectual Performance WDL -- WDL -- -- --       Respiratory    Respiratory WDL -- WDL -- -- --       Breath Sounds    Breath Sounds -- All Fields -- -- --    All Lung Fields Breath Sounds -- equal bilaterally;clear -- -- --       Cardiac    Cardiac WDL -- WDL -- -- --       Peripheral Neurovascular    Peripheral Neurovascular WDL -- WDL -- -- --       Gastrointestinal    Gastrointestinal WDL -- .WDL except;nausea and vomiting .WDL except;GI symptoms  nasuea -- --    Additional Documentation -- -- --  prn medication given at this time -- --       Nausea/Vomiting    Nausea/Vomiting Signs/Symptoms -- nausea intermittent -- -- --       Genitourinary    Genitourinary WDL -- WDL -- -- --       Skin    Skin WDL -- WDL -- -- --       Pieter Risk Assessment    Sensory Perception -- 4-->no impairment -- -- --    Moisture -- 4-->rarely moist -- -- --    Activity -- 4-->walks frequently -- -- --    Mobility -- 4-->no limitation -- -- --    Nutrition -- 3-->adequate -- -- --    Friction and Shear -- 3-->no apparent problem -- -- --    Pieter Score -- 22 -- -- --       Musculoskeletal    Musculoskeletal WDL -- WDL -- -- --       Functional Screen (every 3 days/change)    Ambulation -- 0 - independent -- -- --    Transferring -- 0 - independent -- -- --    Toileting -- 0 - independent -- -- --    Bathing -- 0 - independent -- -- --    Dressing -- 0 - independent -- -- --    Eating -- 0 - independent -- -- --    Communication -- 0 - understands/communicates without difficulty -- -- --    Swallowing -- 0 - swallows foods/liquids without difficulty -- -- --       Nutrition    Diet/Nutrition Received -- regular -- -- --    Diet/Feeding Assistance -- none -- -- --       Adult Sepsis Screening Tool     1. Previous adult screen positive in last 48 hrs? -- no -- -- --    2. Criteria Present -- < 2 criteria present: STOP screening and file documentation for result -- -- --    Result of current screen is: -- Negative -- -- --       [place] [facility] Fall Risk Tool    Age -- 8-->Less than 50 -- -- --    Mental Status -- -4-->Fully alert/oriented at all times -- -- --    Elimination -- 8-->Independent with control of bowel/bladder -- -- --    Medications -- 8-->Psychotropic medications -- -- --    Diagnosis -- 8-->Substance abuse/alcohol abuse -- -- --    Ambulation/Balance -- 7-->Independent/steady gait/immobile -- -- --    Nutrition -- 0-->No apparent abnormalities with appetite -- -- --    Sleep Disturbance -- 8-->No sleep disturbance -- -- --    History of Falls -- 8-->No history of falls -- -- --    Score (Mellisa Fall Risk) -- 51 -- -- --       C-SSRS (Frequent Screener)    Q2 Suicidal Thoughts (Since Last Contact) -- no -- -- --    Q6 Suicide Behavior -- no -- -- --       Violence Risk    Feels Like Hurting Others -- no -- -- --    Previous Attempt to Harm Others -- no -- -- --       Daily Care    Activity (Behavioral Health) -- up ad kasie -- -- --       How much help from another person do you currently need...    Turning from your back to your side while in flat bed without using bedrails? -- none -- -- --    Moving from lying on back to sitting on the side of a flat bed without bedrails? -- none -- -- --    Moving to and from a bed to a chair (including a wheelchair)? -- none -- -- --    Standing up from a chair using your arms (e.g., wheelchair, bedside chair)? -- none -- -- --    Climbing 3-5 steps with a railing? -- none -- -- --    To walk in hospital room? -- none -- -- --    AM-[facility] 6 Clicks Score (PT) -- 24 -- -- --    Highest Level of Mobility Goal -- 8 --> Walked 250 feet or more -- -- --      Row Name 08/10/24 2200 08/10/24 2108 08/10/24 2000 08/10/24 1757 08/10/24 1245       Pain/Comfort/Sleep     Preferred Pain Scale -- -- number (Numeric Rating Pain Scale) -- --    Patient requested Medication Prescribed for Lower Pain Scale -- -- No -- --    (0-10) Pain Rating: Rest -- -- 0 -- --    (0-10) Pain Rating: Activity -- -- 0 -- --       Sleep/Rest    Sleep/Rest/Relaxation -- difficulty falling asleep  Pt request meds for sleep F/U w/flowsheet for sleep unable to stay asleep -- --    Sleep Hygiene Promotion -- -- awakenings minimized -- --       Coping/Psychosocial    Verbalized Emotional State -- -- anxiety;depression -- --    Plan of Care Reviewed With -- -- patient -- --    Patient Agreement with Plan of Care -- -- agrees -- --    Trust Relationship/Rapport -- -- care explained;thoughts/feelings acknowledged -- --       Coping/Psychosocial Interventions    Supportive Measures -- -- active listening utilized -- --       HEENT    HEENT WDL -- -- WDL -- --       Mouth/Teeth WDL    Mouth/Teeth WDL -- -- WDL -- --       Cognitive    Patient rated craving level -- -- 6 -- --       Cognitive/Neuro/Behavioral WDL    Cognitive/Neuro/Behavioral WDL -- -- WDL -- --    Arousal Level -- -- opens eyes spontaneously -- --    Orientation -- -- oriented x 4 -- --    Speech -- -- clear;logical -- --    Mood/Behavior -- -- calm;behavior appropriate to situation -- --       CIWA-Ar (Alcohol Withdrawal Assessment)    Nausea and Vomiting -- -- 1-->mild nausea with no vomiting -- --    Tremor -- -- 1-->not visible, but can be felt fingertip to fingertip -- --    Paroxysmal Sweats -- -- 0-->no sweat visible -- --    Anxiety -- -- 1-->mildly anxious -- --    Agitation -- -- 0-->normal activity -- --    Tactile Disturbances -- -- 1-->very mild itching, pins and needles, burning or numbness -- --    Auditory Disturbances -- -- 0-->not present -- --    Visual Disturbances -- -- 0-->not present -- --    Headache, Fullness in Head -- -- 0-->not present -- --    Orientation and Clouding of Sensorium -- -- 0-->oriented and can do serial  additions -- --    CIWA-Ar Score -- -- 4 -- --       COWS (Clinical Opiate Withdrawal Scale)    Resting Pulse Rate -- -- --  pt denies -- --       Neuro    Additional Documentation [place] Coma Scale (Group) -- [place] Coma Scale (Group) [place] Coma Scale (Group) [place] Coma Scale (Group)       [place] Coma Scale    Best Eye Response 4-->(E4) spontaneous -- 4-->(E4) spontaneous 4-->(E4) spontaneous 4-->(E4) spontaneous    Best Motor Response 6-->(M6) obeys commands -- 6-->(M6) obeys commands 6-->(M6) obeys commands 6-->(M6) obeys commands    Best Verbal Response 5-->(V5) oriented -- 5-->(V5) oriented 5-->(V5) oriented 5-->(V5) oriented    [place] Coma Scale Score 15 -- 15 15 15    Assessment Qualifiers patient not sedated/intubated -- patient not sedated/intubated -- --       Behavioral    General Appearance WDL -- -- .WDL except;appearance -- --    General Appearance -- -- unkempt -- --       Behavior WDL    Behavior WDL -- -- WDL -- --       Emotion Mood WDL    Emotion/Mood/Affect WDL -- -- .WDL except;emotion mood -- --    Emotion/Mood -- -- anxious;calm -- --    Patient rated anxiety level -- -- 8 -- --    Patient rated depression level -- -- 6 -- --       Speech WDL    Speech WDL -- -- WDL -- --    Speech -- -- soft/quiet -- --       Perceptual State WDL    Perceptual State WDL -- -- WDL -- --    Hallucinations -- -- denies hallucinations -- --    Perceptual State -- -- consistent with reality -- --       Thought Process WDL    Thought Process WDL -- -- .WDL except;judgment and insight -- --    Judgment and Insight -- -- insight not appropriate to situation -- --       Intellectual Performance WDL    Intellectual Performance WDL -- -- WDL -- --       Respiratory    Respiratory WDL -- -- WDL -- --       Breath Sounds    All Lung Fields Breath Sounds -- -- equal bilaterally;clear -- --       Oxygen Therapy    Device (Oxygen Therapy) -- -- room air -- --       Cardiac    Cardiac WDL -- -- WDL -- --        Peripheral Neurovascular    Peripheral Neurovascular WDL -- -- WDL -- --       Gastrointestinal    Gastrointestinal WDL -- -- .WDL except;GI symptoms -- --    GI Signs/Symptoms -- -- abdominal discomfort -- --    Last Bowel Movement -- -- 08/09/24 -- --       Nausea/Vomiting    Nausea/Vomiting Signs/Symptoms -- nausea intermittent nausea intermittent -- --    Nausea/Vomiting Interventions -- see MAR -- -- --    Nausea/Vomiting Outcome relief of signs/symptoms -- -- -- --       Genitourinary    Genitourinary WDL -- -- WDL -- --       Skin    Skin WDL -- -- WDL -- --       Pieter Risk Assessment    Sensory Perception -- -- 4-->no impairment -- --    Moisture -- -- 4-->rarely moist -- --    Activity -- -- 4-->walks frequently -- --    Mobility -- -- 4-->no limitation -- --    Nutrition -- -- 3-->adequate -- --    Friction and Shear -- -- 3-->no apparent problem -- --    Pieter Score -- -- 22 -- --       Musculoskeletal    Musculoskeletal WDL -- -- WDL -- --    General Mobility -- -- no overt deficits noted -- --       Functional Screen (every 3 days/change)    Ambulation -- -- 0 - independent -- --    Transferring -- -- 0 - independent -- --    Toileting -- -- 0 - independent -- --    Bathing -- -- 0 - independent -- --    Dressing -- -- 0 - independent -- --    Eating -- -- 0 - independent -- --    Communication -- -- 0 - understands/communicates without difficulty -- --    Swallowing -- -- 0 - swallows foods/liquids without difficulty -- --       Nutrition    Diet/Nutrition Received -- -- regular -- --       Adult Sepsis Screening Tool    1. Previous adult screen positive in last 48 hrs? -- -- no -- --    2. Criteria Present -- -- < 2 criteria present: STOP screening and file documentation for result -- --    3. Known or Suspected Infection Present -- -- none: STOP screening and file documentation for result -- --    4. Signs of Possible Organ Dysfunction Present -- -- none: STOP screening and file documentation for  result -- --    Result of current screen is: -- -- Negative -- --       Safety Management    Safety Promotion/Fall Prevention -- -- clutter free environment maintained;safety round/check completed -- --    Medication Review/Management -- -- medications reviewed -- --       [place] [facility] Fall Risk Tool    Age -- -- 8-->Less than 50 -- --    Mental Status -- -- -4-->Fully alert/oriented at all times -- --    Elimination -- -- 8-->Independent with control of bowel/bladder -- --    Medications -- -- 8-->Psychotropic medications -- --    Diagnosis -- -- 8-->Substance abuse/alcohol abuse -- --    Ambulation/Balance -- -- 7-->Independent/steady gait/immobile -- --    Nutrition -- -- 0-->No apparent abnormalities with appetite -- --    Sleep Disturbance -- -- 12-->Report of sleep disturbance by patient, family, or staff -- --    History of Falls -- -- 8-->No history of falls -- --    Score (Mellisa Fall Risk) -- -- 55 -- --       C-SSRS (Frequent Screener)    Q2 Suicidal Thoughts (Since Last Contact) -- -- no -- --    Q6 Suicide Behavior -- -- no -- --       Violence Risk    Feels Like Hurting Others -- -- no -- --    Previous Attempt to Harm Others -- -- no -- --       Daily Care    Activity (Behavioral Health) -- -- up ad kasie -- --       Hygiene Care Assistance    Hygiene Assistance -- -- independent -- --       How much help from another person do you currently need...    Turning from your back to your side while in flat bed without using bedrails? -- -- none -- --    Moving from lying on back to sitting on the side of a flat bed without bedrails? -- -- none -- --    Moving to and from a bed to a chair (including a wheelchair)? -- -- none -- --    Standing up from a chair using your arms (e.g., wheelchair, bedside chair)? -- -- none -- --    Climbing 3-5 steps with a railing? -- -- none -- --    To walk in hospital room? -- -- none -- --    AM-[facility] 6 Clicks Score (PT) -- -- 24 -- --    Highest Level of Mobility  Goal -- -- 8 --> Walked 250 feet or more -- --      Row Name 08/10/24 0930 08/10/24 0915 08/10/24 0833 08/10/24 0600 08/10/24 0200       Pain/Comfort/Sleep    Preferred Pain Scale -- number (Numeric Rating Pain Scale) number (Numeric Rating Pain Scale) -- --    Pain Location -- -- other (see comments)  generalized body aches -- --    Pain Side/Orientation -- -- generalized -- --    Pain Description -- -- aching -- --    Nonverbal Indicators of Pain -- -- grimace;agitated -- --    Pain Management Interventions -- -- see MAR  prn motrin administered -- --       Sleep/Rest    Sleep/Rest/Relaxation -- difficulty falling asleep -- -- --       Coping/Psychosocial    Verbalized Emotional State anxiety anxiety;depression anxiety  prn atarax administered -- --    Plan of Care Reviewed With -- patient -- -- --    Patient Agreement with Plan of Care -- agrees -- -- --       Coping/Psychosocial Interventions    Supportive Measures -- active listening utilized -- -- --       HEENT    HEENT WDL -- WDL -- -- --       Mouth/Teeth WDL    Mouth/Teeth WDL -- WDL -- -- --       Cognitive/Neuro/Behavioral WDL    Cognitive/Neuro/Behavioral WDL -- WDL;all -- -- --    Arousal Level -- opens eyes spontaneously -- -- --    Orientation -- oriented x 4 -- -- --    Speech -- clear;spontaneous;logical -- -- --    Mood/Behavior -- cooperative;behavior appropriate to situation;calm -- -- --       CIWA-Ar (Alcohol Withdrawal Assessment)    Nausea and Vomiting -- 0-->no nausea and no vomiting -- -- --    Tremor -- 1-->not visible, but can be felt fingertip to fingertip -- -- --    Paroxysmal Sweats -- 0-->no sweat visible -- -- --    Anxiety -- 1-->mildly anxious -- -- --    Agitation -- 0-->normal activity -- -- --    Tactile Disturbances -- 1-->very mild itching, pins and needles, burning or numbness -- -- --    Auditory Disturbances -- 0-->not present -- -- --    Visual Disturbances -- 0-->not present -- -- --    Headache, Fullness in Head --  0-->not present -- -- --    Orientation and Clouding of Sensorium -- 0-->oriented and can do serial additions -- -- --    CIWA-Ar Score -- 3 -- -- --       Neuro    Additional Documentation -- [place] Coma Scale (Group) -- [place] Coma Scale (Group) [place] Coma Scale (Group)       [place] Coma Scale    Best Eye Response -- 4-->(E4) spontaneous -- 4-->(E4) spontaneous 4-->(E4) spontaneous    Best Motor Response -- 6-->(M6) obeys commands -- 6-->(M6) obeys commands 6-->(M6) obeys commands    Best Verbal Response -- 5-->(V5) oriented -- 5-->(V5) oriented 5-->(V5) oriented    [place] Coma Scale Score -- 15 -- 15 15    Assessment Qualifiers -- -- -- patient not sedated/intubated patient not sedated/intubated       Behavioral    General Appearance WDL -- .WDL except;appearance -- -- --    General Appearance -- unkempt -- -- --       Behavior WDL    Behavior WDL -- WDL -- -- --       Emotion Mood WDL    Emotion/Mood/Affect WDL -- WDL -- -- --    Emotion/Mood -- calm -- -- --    Patient rated anxiety level -- 5 -- -- --    Patient rated depression level -- 5 -- -- --       Speech WDL    Speech WDL -- WDL -- -- --       Perceptual State WDL    Perceptual State WDL -- WDL -- -- --       Thought Process WDL    Thought Process WDL -- .WDL except;judgment and insight -- -- --    Judgment and Insight -- insight not appropriate to situation;judgment not appropriate to situation -- -- --       Intellectual Performance WDL    Intellectual Performance WDL -- WDL -- -- --       Respiratory    Respiratory WDL -- WDL -- -- --       Breath Sounds    Breath Sounds -- All Fields -- -- --    All Lung Fields Breath Sounds -- equal bilaterally;clear -- -- --       Cardiac    Cardiac WDL -- WDL -- -- --       Peripheral Neurovascular    Peripheral Neurovascular WDL -- WDL -- -- --       Gastrointestinal    Gastrointestinal WDL -- WDL .WDL except;nausea and vomiting -- --       Nausea/Vomiting    Nausea/Vomiting Signs/Symptoms -- -- nausea  intermittent -- --    Nausea/Vomiting Interventions -- -- see MAR  prn zofran administered -- --    Nausea/Vomiting Outcome relief of signs/symptoms -- -- -- --       Genitourinary    Genitourinary WDL -- WDL -- -- --       Skin    Skin WDL -- WDL -- -- --       [place]eter Risk Assessment    Sensory Perception -- 4-->no impairment -- -- --    Moisture -- 4-->rarely moist -- -- --    Activity -- 4-->walks frequently -- -- --    Mobility -- 4-->no limitation -- -- --    Nutrition -- 3-->adequate -- -- --    Friction and Shear -- 3-->no apparent problem -- -- --    Pieter Score -- 22 -- -- --       Musculoskeletal    Musculoskeletal WDL -- WDL -- -- --       Functional Screen (every 3 days/change)    Ambulation -- 0 - independent -- -- --    Transferring -- 0 - independent -- -- --    Toileting -- 0 - independent -- -- --    Bathing -- 0 - independent -- -- --    Dressing -- 0 - independent -- -- --    Eating -- 0 - independent -- -- --    Communication -- 0 - understands/communicates without difficulty -- -- --    Swallowing -- 0 - swallows foods/liquids without difficulty -- -- --       Nutrition    Diet/Nutrition Received -- regular -- -- --    Diet/Feeding Assistance -- none -- -- --       Adult Sepsis Screening Tool    1. Previous adult screen positive in last 48 hrs? -- no -- -- --    2. Criteria Present -- < 2 criteria present: STOP screening and file documentation for result -- -- --    Result of current screen is: -- Negative -- -- --       [place] Psychiatric Fall Risk Tool    Age -- 8-->Less than 50 -- -- --    Mental Status -- -4-->Fully alert/oriented at all times -- -- --    Elimination -- 8-->Independent with control of bowel/bladder -- -- --    Medications -- 8-->Psychotropic medications -- -- --    Diagnosis -- 8-->Substance abuse/alcohol abuse -- -- --    Ambulation/Balance -- 7-->Independent/steady gait/immobile -- -- --    Nutrition -- 0-->No apparent abnormalities with appetite -- -- --    Sleep  Disturbance -- 8-->No sleep disturbance -- -- --    History of Falls -- 8-->No history of falls -- -- --    Score (Hood Fall Risk) -- 51 -- -- --       C-SSRS (Frequent Screener)    Q2 Suicidal Thoughts (Since Last Contact) -- no -- -- --    Q6 Suicide Behavior -- no -- -- --       Violence Risk    Feels Like Hurting Others -- no -- -- --    Previous Attempt to Harm Others -- no -- -- --       Daily Care    Activity (Behavioral Health) -- up ad kasie -- -- --       How much help from another person do you currently need...    Turning from your back to your side while in flat bed without using bedrails? -- none -- -- --    Moving from lying on back to sitting on the side of a flat bed without bedrails? -- none -- -- --    Moving to and from a bed to a chair (including a wheelchair)? -- none -- -- --    Standing up from a chair using your arms (e.g., wheelchair, bedside chair)? -- none -- -- --    Climbing 3-5 steps with a railing? -- none -- -- --    To walk in hospital room? -- none -- -- --    AM-PAC 6 Clicks Score (PT) -- 24 -- -- --    Highest Level of Mobility Goal -- 8 --> Walked 250 feet or more -- -- --      Row Name 08/10/24 0111 08/10/24 0012 08/10/24 0011 08/09/24 2200          Sleep/Rest    Sleep/Rest/Relaxation -- difficulty falling asleep  Pt request medication for sleep PRN medication administered per order f/u w/flowsheet for sleep -- --        CIWA-Ar (Alcohol Withdrawal Assessment)    Nausea and Vomiting 1-->mild nausea with no vomiting -- 1-->mild nausea with no vomiting --     Tremor 4-->moderate, with patient's arms extended -- 4-->moderate, with patient's arms extended --     Paroxysmal Sweats 1-->barely perceptible sweating, palms moist -- 1-->barely perceptible sweating, palms moist --     Anxiety 1-->mildly anxious -- 2 --     Agitation 0-->normal activity -- 0-->normal activity --     Tactile Disturbances 1-->very mild itching, pins and needles, burning or numbness -- 1-->very mild  itching, pins and needles, burning or numbness --     Auditory Disturbances 0-->not present -- 0-->not present --     Visual Disturbances 0-->not present -- 0-->not present --     Headache, Fullness in Head 0-->not present -- 0-->not present --     Orientation and Clouding of Sensorium 0-->oriented and can do serial additions -- 0-->oriented and can do serial additions --     CIWA-Ar Score 8 -- 9 --        Neuro    Additional Documentation -- -- -- --        [place] Coma Scale    Best Eye Response -- -- -- --     Best Motor Response -- -- -- --     Best Verbal Response -- -- -- --     [place] Coma Scale Score -- -- -- --     Assessment Qualifiers -- -- -- --        Emotion Mood WDL    Emotion/Mood/Affect WDL WDL -- .WDL except;emotion mood --     Emotion/Mood calm -- anxious  PRN medication administered per order --                   Biopsychosocial Assessment (all recorded)       BIOPSYCHOSOCIAL ASSESSMENT       Row Name 08/11/24 0919 08/10/24 2000 08/10/24 1305 08/10/24 1250 08/10/24 0915       Housing/Living Environment    Current Living Arrangements -- -- home -- --    Will your living arrangements affect your treatment/recovery? -- -- -- No --       Income    Source of Income -- -- -- salary/wages --           Has patient been in the ? -- -- -- No --       Violence Risk    Feels Like Hurting Others no no -- -- no    Previous Attempt to Harm Others no no -- -- no       Abuse Screen (yes response referral indicated)    Feels Unsafe at Home or Work/School -- -- no -- --    Feels Threatened by Someone -- -- no -- --    Does Anyone Try to Keep You From Having Contact with Others or Doing Things Outside Your Home? -- -- no -- --    Physical Signs of Abuse Present -- -- no -- --       AUDIT-C (Alcohol Use Disorders ID Test)    Q1: How often do you have a drink containing alcohol? -- -- 4 or more times a week -- --    Q2: How many drinks containing alcohol do you have on a typical day when you are  drinking? -- -- 10 or more -- --    Q3: How often do you have six or more drinks on one occasion? -- -- Daily or almost daily -- --    Audit-C Score -- -- 12 -- --       Chemical Dependency Addendum - Please document details of specific substances in the History Activity    Patient has attended AA/NA -- -- -- No --    Patient rated craving level -- 6 -- -- --       General Appearance WDL    General Appearance WDL .WDL except;appearance .WDL except;appearance -- -- .WDL except;appearance    General Appearance unkempt unkempt -- -- unkempt       Speech WDL    Speech WDL WDL WDL -- -- WDL    Speech -- soft/quiet -- -- --       Thought Process WDL    Thought Process WDL .WDL except;judgment and insight .WDL except;judgment and insight -- -- .WDL except;judgment and insight    Judgment and Insight insight not appropriate to situation;judgment not appropriate to situation insight not appropriate to situation -- -- insight not appropriate to situation;judgment not appropriate to situation       Emotion Mood WDL    Emotion/Mood/Affect WDL .WDL except;all .WDL except;emotion mood -- -- WDL    Emotion/Mood anxious;melancholic anxious;calm -- -- calm    Patient rated depression level 5 6 -- -- 5    Patient rated anxiety level 8 8 -- -- 5      Row Name 08/10/24 0241 08/10/24 0237 08/10/24 0233 08/10/24 0213 08/10/24 0212       Advance Directives (For Healthcare)    Pre-existing AND/MOST/POLST Order No No -- -- --    Advance Directive Status Patient does not have advance directive Patient does not have advance directive Patient does not have advance directive -- --    Have you reviewed your Advance Directive and is it valid for this stay? No No No -- --    Literature Provided on Advance Directives No No No -- --    Patient Requests Assistance on Advance Directives Patient Declined Patient Declined Patient Declined -- --       Housing/Living Environment    Current Living Arrangements -- -- -- home --       C-SSRS (Recent)    Q1  Wished to be Dead (Past Month) -- -- -- -- no    Q2 Suicidal Thoughts (Past Month) -- -- -- -- no    Q6 Suicide Behavior (Lifetime) -- -- -- -- no    Level of Risk per Screen -- -- -- -- screen negative       AUDIT-C (Alcohol Use Disorders ID Test)    Q1: How often do you have a drink containing alcohol? -- -- -- -- 4 or more times a week    Q2: How many drinks containing alcohol do you have on a typical day when you are drinking? -- -- -- -- 10 or more    Q3: How often do you have six or more drinks on one occasion? -- -- -- -- Daily or almost daily    Audit-C Score -- -- -- -- 12      Row Name 08/10/24 0211 08/10/24 0111 08/10/24 0011             Violence Risk    Feels Like Hurting Others -- no --      Previous Attempt to Harm Others -- no --         Abuse Screen (yes response referral indicated)    Feels Unsafe at Home or Work/School no -- --      Feels Threatened by Someone no -- --      Does Anyone Try to Keep You From Having Contact with Others or Doing Things Outside Your Home? no -- --      Physical Signs of Abuse Present no -- --         General Appearance WDL    General Appearance WDL -- .WDL except;appearance --      General Appearance -- unkempt --         Speech WDL    Speech WDL -- speech --      Speech -- soft/quiet --         Thought Process WDL    Thought Process WDL -- .WDL except;judgment and insight --      Judgment and Insight -- insight not appropriate to situation;judgment not appropriate to situation --         Emotion Mood WDL    Emotion/Mood/Affect WDL -- WDL .WDL except;emotion mood      Emotion/Mood -- calm anxious  PRN medication administered per order      Patient rated depression level -- 9 --      Patient rated anxiety level -- 8 --

## 2024-08-11 NOTE — PLAN OF CARE
Goal Outcome Evaluation:  Plan of Care Reviewed With: patient  Patient Agreement with Plan of Care: agrees     Progress: improving  Outcome Evaluation: Patient reports anxiety8 /depression6 /craving6 . Patient is calm and cooperative with staff and other patient's. Pt denies any SI/HI/AVH. Pt reports tolerating medication regime well. Patient interactive in nursing group and reports poor sleep and good appetite.

## 2024-08-12 LAB — BACTERIA SPEC AEROBE CULT: ABNORMAL

## 2024-08-12 PROCEDURE — 99232 SBSQ HOSP IP/OBS MODERATE 35: CPT | Performed by: PSYCHIATRY & NEUROLOGY

## 2024-08-12 PROCEDURE — 63710000001 ONDANSETRON ODT 4 MG TABLET DISPERSIBLE: Performed by: PSYCHIATRY & NEUROLOGY

## 2024-08-12 RX ORDER — ROPINIROLE 1 MG/1
0.5 TABLET, FILM COATED ORAL 3 TIMES DAILY
Status: DISCONTINUED | OUTPATIENT
Start: 2024-08-12 | End: 2024-08-14 | Stop reason: HOSPADM

## 2024-08-12 RX ADMIN — ROPINIROLE HYDROCHLORIDE 0.5 MG: 1 TABLET, FILM COATED ORAL at 17:35

## 2024-08-12 RX ADMIN — ONDANSETRON 4 MG: 4 TABLET, ORALLY DISINTEGRATING ORAL at 21:19

## 2024-08-12 RX ADMIN — BUPRENORPHINE HYDROCHLORIDE AND NALOXONE HYDROCHLORIDE DIHYDRATE 2 TABLET: 8; 2 TABLET SUBLINGUAL at 08:26

## 2024-08-12 RX ADMIN — LORAZEPAM 1 MG: 1 TABLET ORAL at 14:40

## 2024-08-12 RX ADMIN — LORAZEPAM 1 MG: 1 TABLET ORAL at 21:19

## 2024-08-12 RX ADMIN — ONDANSETRON 4 MG: 4 TABLET, ORALLY DISINTEGRATING ORAL at 08:27

## 2024-08-12 RX ADMIN — ROPINIROLE HYDROCHLORIDE 0.5 MG: 1 TABLET, FILM COATED ORAL at 13:48

## 2024-08-12 RX ADMIN — IBUPROFEN 400 MG: 400 TABLET, FILM COATED ORAL at 14:40

## 2024-08-12 RX ADMIN — ONDANSETRON 4 MG: 4 TABLET, ORALLY DISINTEGRATING ORAL at 14:40

## 2024-08-12 RX ADMIN — SULFAMETHOXAZOLE AND TRIMETHOPRIM 1 TABLET: 800; 160 TABLET ORAL at 08:26

## 2024-08-12 RX ADMIN — Medication 1 TABLET: at 08:27

## 2024-08-12 RX ADMIN — ROPINIROLE HYDROCHLORIDE 0.5 MG: 1 TABLET, FILM COATED ORAL at 23:35

## 2024-08-12 RX ADMIN — Medication 100 MG: at 08:27

## 2024-08-12 RX ADMIN — SULFAMETHOXAZOLE AND TRIMETHOPRIM 1 TABLET: 800; 160 TABLET ORAL at 21:19

## 2024-08-12 RX ADMIN — IBUPROFEN 400 MG: 400 TABLET, FILM COATED ORAL at 08:27

## 2024-08-12 RX ADMIN — LORAZEPAM 1 MG: 1 TABLET ORAL at 08:26

## 2024-08-12 RX ADMIN — HYDROXYZINE HYDROCHLORIDE 50 MG: 50 TABLET ORAL at 08:27

## 2024-08-12 RX ADMIN — HYDROXYZINE HYDROCHLORIDE 50 MG: 50 TABLET ORAL at 14:40

## 2024-08-12 RX ADMIN — Medication 2 TABLET: at 08:26

## 2024-08-12 NOTE — PROGRESS NOTES
"INPATIENT PSYCHIATRIC PROGRESS NOTE    Name:  Emilie Greenberg  :  1990  MRN:  [id number]  Visit Number:  [id number]  Length of stay:  3    SUBJECTIVE    CC/Focus of Exam: Alcohol and opioid dependence    INTERVAL HISTORY:  Patient reports first day of mild improvement, although still citing muscle cramps, restlessness, skin crawling, nausea and vomiting, insomnia, anxiety.  Hopeful to tolerate more breakfast today.  Tolerating medication.    Depression rating 3/10  Anxiety rating 7/10  Sleep: Fair, 7 hours with several interruptions  Withdrawal sx: Per HPI  Craving: 3/10    Review of Systems   Constitutional:  Positive for appetite change and chills.   Respiratory: Negative.     Cardiovascular: Negative.    Gastrointestinal:  Positive for nausea.   Musculoskeletal:  Positive for myalgias.   Neurological:  Positive for tremors.   Psychiatric/Behavioral:  Positive for dysphoric mood and sleep disturbance. The patient is nervous/anxious.        OBJECTIVE    Temp:  [97.3 °F (36.3 °C)-99 °F (37.2 °C)] 97.3 °F (36.3 °C)  Heart Rate:  [59-63] 63  Resp:  [16-18] 16  BP: (104-123)/(59-76) 106/60    MENTAL STATUS EXAM:  Appearance: Casually dressed, fair hygeine.   Cooperation: Cooperative  Psychomotor: No psychomotor agitation/retardation, No EPS, No motor tics  Speech: normal rate, amount.  Mood: \"Maybe a little better\"   Affect: congruent, anxious  Thought Content: goal directed, no delusional material present  Thought process: linear, organized.  Suicidality: No SI  Homicidality: No HI  Perception: No AH/VH  Insight: fair   Judgment: fair    Lab Results (last 24 hours)       ** No results found for the last 24 hours. **               Imaging Results (Last 24 Hours)       ** No results found for the last 24 hours. **               ECG/EMG Results (most recent)       Procedure Component Value Units Date/Time    ECG 12 Lead QT Measurement [[id number]] Collected: 24 1104     Updated: 24 1611     QT Interval " 492 ms      QTC Interval 466 ms     Narrative:      Test Reason : QT Measurement  Blood Pressure :   */*   mmHG  Vent. Rate :  54 BPM     Atrial Rate :  54 BPM     P-R Int : 136 ms          QRS Dur :  86 ms      QT Int : 492 ms       P-R-T Axes :  18  54  59 degrees     QTc Int : 466 ms    Sinus bradycardia  Otherwise normal ECG  When compared with ECG of 09-AUG-2024 19:35,  No significant change was found  Confirmed by Jermaine Sánchez ([id number]) on 2024 4:11:01 PM    Referred By:            Confirmed By: Jermaine Sánchez             ALLERGIES: Patient has no known allergies.      Current Facility-Administered Medications:     aluminum-magnesium hydroxide-simethicone (MAALOX MAX) 400-400-40 MG/5ML suspension 15 mL, 15 mL, Oral, Q6H PRN, Rojelio Faust MD    B-complex with vitamin C tablet 2 tablet, 2 tablet, Oral, Daily, Rojelio Faust MD, 2 tablet at 24 0826    benzonatate (TESSALON) capsule 100 mg, 100 mg, Oral, TID PRN, Rojelio Faust MD    benztropine (COGENTIN) tablet 2 mg, 2 mg, Oral, Once PRN **OR** benztropine (COGENTIN) injection 1 mg, 1 mg, Intramuscular, Once PRN, Rojelio Faust MD    buprenorphine-naloxone (SUBOXONE) 8-2 MG per SL tablet 2 tablet, 2 tablet, Sublingual, Daily, Martina Faust MD, 2 tablet at 24 08    famotidine (PEPCID) tablet 20 mg, 20 mg, Oral, BID PRN, Rojelio Faust MD    hydrOXYzine (ATARAX) tablet 50 mg, 50 mg, Oral, Q6H PRN, Rojelio Faust MD, 50 mg at 24 08    ibuprofen (ADVIL,MOTRIN) tablet 400 mg, 400 mg, Oral, Q6H PRN, Rojelio Faust MD, 400 mg at 24 08    loperamide (IMODIUM) capsule 2 mg, 2 mg, Oral, Q2H PRN, Rojelio Faust MD    [] LORazepam (ATIVAN) tablet 2 mg, 2 mg, Oral, 3 times per day, 2 mg at 08/10/24 2108 **FOLLOWED BY** [COMPLETED] LORazepam (ATIVAN) tablet 1.5 mg, 1.5 mg, Oral, 3 times per day, 1.5 mg at 24 **FOLLOWED BY** LORazepam (ATIVAN) tablet 1 mg, 1 mg, Oral, 3 times per day, 1 mg at 2424  **FOLLOWED BY** [START ON 2024] LORazepam (ATIVAN) tablet 0.5 mg, 0.5 mg, Oral, 3 times per day, Rojelio Faust MD    [] LORazepam (ATIVAN) tablet 2 mg, 2 mg, Oral, Q1H PRN **FOLLOWED BY** [] LORazepam (ATIVAN) tablet 1.5 mg, 1.5 mg, Oral, Q1H PRN **FOLLOWED BY** LORazepam (ATIVAN) tablet 1 mg, 1 mg, Oral, Q1H PRN **FOLLOWED BY** [START ON 2024] LORazepam (ATIVAN) tablet 0.5 mg, 0.5 mg, Oral, Q1H PRN, Rojelio Faust MD    magnesium hydroxide (MILK OF MAGNESIA) suspension 10 mL, 10 mL, Oral, Daily PRN, Rojelio Faust MD    multivitamin with minerals 1 tablet, 1 tablet, Oral, Daily, Rojelio Faust MD, 1 tablet at 24 08    ondansetron ODT (ZOFRAN-ODT) disintegrating tablet 4 mg, 4 mg, Translingual, Q6H PRN, Rojelio Faust MD, 4 mg at 24 08    polyethylene glycol (MIRALAX) packet 17 g, 17 g, Oral, Daily PRN, Rojelio Faust MD    rOPINIRole (REQUIP) tablet 0.5 mg, 0.5 mg, Oral, TID, Alton Chairez MD, 0.5 mg at 24 1348    sodium chloride nasal spray 2 spray, 2 spray, Each Nare, PRN, Rojelio Faust MD    sulfamethoxazole-trimethoprim (BACTRIM DS,SEPTRA DS) 800-160 MG per tablet 1 tablet, 1 tablet, Oral, Q12H, Alton Chairez MD, 1 tablet at 24 0826    thiamine (VITAMIN B-1) tablet 100 mg, 100 mg, Oral, Daily, Rojelio Faust MD, 100 mg at 24 08    traZODone (DESYREL) tablet 50 mg, 50 mg, Oral, Nightly PRN, Rojelio Faust MD, 50 mg at 24 2137    Reviewed chart, notes, vitals, labs and EKG personally reviewed.    ASSESSMENT & PLAN:      Alcohol use disorder, severe, dependence, in withdrawal  -Admitted for medically assisted detox  -Continue Ativan detox protocol  -Supplementary vitamins and comfort meds ordered  -Begin ropinirole 0.5 mg 3 times daily for significant restlessness  -We will encourage rehab or other intensive substance abuse treatment following discharge     Opioid use disorder, severe, dependence, on maintenance therapy  -Admission UDS  positive for buprenorphine  -Continue Suboxone 16 mg daily  -We will refer for substance abuse treatment following hospitalization     Prolonged Qtc  -506 on admission EKG  -Repeat EKG improved to 466     Microcytosis  -.3  -Pending vitamin B12, folate level  -Reviewed reticulocyte, percentage increased to 3.57, absolute normal.  -Patient not anemic or symptomatic at this time, so can be deferred to PCP     Urinary tract infection  -Urine culture growing gram-negative bacilli  -Started Bactrim DS twice daily for 3 days    Hospital bed: No     The patient has been admitted for safety and stabilization.  Patient will be monitored for suicidality daily and maintained on Special Precautions Level 4 (q30 min checks).  The patient will have individual and group therapy with a master's level therapist. A master treatment plan will be developed and agreed upon by the patient and his/her treatment team.  The patient's estimated length of stay in the hospital is 2-4 days.     Special precautions: Special Precautions Level 3 (q15 min checks)     Behavioral Health Treatment Plan and Problem List: I have reviewed and approved the Behavioral Health Treatment Plan and Problem list.  The patient has had a chance to review and agrees with the treatment plan.    I have reviewed the copied text and it is accurate as of 08/12/24     Clinician:  Alton Chairez MD  08/12/24  14:03 EDT

## 2024-08-12 NOTE — PLAN OF CARE
Goal Outcome Evaluation:        Problem: Adult Behavioral Health Plan of Care  Goal: Patient-Specific Goal (Individualization)  Outcome: Ongoing, Progressing  Flowsheets  Taken 8/10/2024 1301 by Radha Denise  Patient-Specific Goals (Include Timeframe): Patient will deny acute alcohol withdrawal symptoms in 1-7 days.  Patient will identify 1-2 healthy coping skills to use as a part of alcohol relapse prevention in 1-7 days.  Patient will consent to aftercare referral with [facility] in 1-7 days  Individualized Care Needs: Therapist will offer 1-4 individual sessions focusing on CBT coping skills and relapse prevention prior to discharge. Therapist will offer family education prior to discharge. Therapist will offer aftercare involving [facility] treatment prior to discharge.  Anxieties, Fears or Concerns: none reported  Taken 8/10/2024 1250 by Radha Denise  Patient Personal Strengths:   expressive of needs   expressive of emotions   motivated for treatment   realistic evaluation of current/future capabilities   resilient   resourceful   spiritual/[place] support   positive vocational history   positive educational history   community support   family/social support  Patient Vulnerabilities:   substance abuse/addiction   poor impulse control  Goal: Optimized Coping Skills in Response to Life Stressors  Outcome: Ongoing, Progressing  Flowsheets (Taken 8/12/2024 0923)  Optimized Coping Skills in Response to Life Stressors: making progress toward outcome  Intervention: Promote Effective Coping Strategies  Flowsheets (Taken 8/12/2024 0923)  Supportive Measures:   active listening utilized   counseling provided   goal-setting facilitated   verbalization of feelings encouraged   self-responsibility promoted   decision-making supported   self-reflection promoted   positive reinforcement provided   self-care encouraged  Goal: Develops/Participates in Therapeutic [place] to Support  Successful Transition  Outcome: Ongoing, Progressing  Flowsheets (Taken 8/12/2024 0923)  Develops/Participates in Therapeutic [place] to Support Successful Transition: making progress toward outcome  Intervention: Foster Therapeutic [place]  Flowsheets (Taken 8/12/2024 0923)  Trust Relationship/Rapport:   care explained   questions encouraged   reassurance provided   choices provided   emotional support provided   thoughts/feelings acknowledged   empathic listening provided   questions answered  Intervention: Mutually Develop Transition Plan  Flowsheets  Taken 8/12/2024 0921 by Kyung Jean CSW  Discharge Coordination/Progress: Patient has [employer]. Patient plans to return home at discharge and follow up outpatient with [facility]. Family to provide transportation.  Transportation Anticipated: family or friend will provide  Transportation Concerns: no car  Current Discharge Risk: substance use/abuse  Concerns to be Addressed:   substance/tobacco abuse/use   coping/stress   discharge planning  Readmission Within the Last 30 Days: no previous admission in last 30 days  Patient/Family Anticipated Services at Transition:   outpatient care   mental health services  Patient's Choice of Community Agency(s): [facility]  Patient/Family Anticipates Transition to: home with family  Offered/Gave Vendor List: no  Taken 8/10/2024 1301 by Radha Denise  Outpatient/Agency/Support Group Needs: outpatient substance abuse treatment (specify)  Transition Support:   community resources reviewed   follow-up care coordinated   follow-up care discussed   crisis management plan promoted   crisis management plan verbalized  Anticipated Discharge Disposition: home with family  Taken 8/10/2024 1259 by Radha Denise  Concerns Comments: NA      DATA:   Therapist met with Patient individually on this date. Therapist introduced self as covering therapist and explained that Patient's primary therapist  would not be present today. Patient agreeable to discuss treatment progress and discharge concerns.     CLINICAL MANUVERING/INTERVENTIONS:   Assisted Patient in processing session content; acknowledged and normalized Patient’s thoughts, feelings, and concerns by utilizing a person-centered approach in efforts to build appropriate rapport and a positive therapeutic relationship with open and honest communication. Allowed Patient to ventilate regarding current stressors and triggers for negative emotions and thoughts in a safe nonjudgmental environment with unconditional positive regard, active listening skills, and empathy. Therapist implemented motivational interviewing techniques to assist Patient with exploring personal growth and change and discussed distress tolerance skills, self soothing techniques, and applied cognitive behavioral strategies to facilitate identification of maladaptive patterns of thinking and behavior. Therapist assisted Patient with identifying and implementing healthier coping strategies.     ASSESSMENT:  Patient continues to receive treatment for detox. She reports high anxiety and moderate depression, denies SI/HI/AVH. Patient experiencing chills, muscle cramps, restlessness, skin crawling sensation, nausea, vomiting, and insomnia. Patient encouraged to pursue residential treatment at discharge but she is not agreeable. Patient plans to return home when stable and plans to follow up outpatient with [facility] in [place] for aftercare. Patient denies having any additional needs or concerns.     PLAN:   Patient will continue stabilization. Patient will continue to receive services offered by Treatment Team.     Therapist recommends [facility] residential rehab. Patient plans to return home at discharge and follow up outpatient with [facility] in [place].

## 2024-08-12 NOTE — DISCHARGE INSTR - APPOINTMENTS
[facility]  [address] [place] [address]  [phone]    August 15 2024 at 1:30pm

## 2024-08-12 NOTE — PLAN OF CARE
"Goal Outcome Evaluation:  Plan of Care Reviewed With: patient  Patient Agreement with Plan of Care: agrees     Progress: improving  Outcome Evaluation: Pt reports feeling \"edgy\" but reports good appetite and rates anxiety 8/10. Pt reports \"off and on\" sleep and rates depression/craving 6/10. Pt denies SI/HI/AVH.    Pt appeared to sleep the majority of the night, approximately 7 hours this shift.                           "

## 2024-08-12 NOTE — PLAN OF CARE
Problem: Adult Behavioral Health Plan of Care  Goal: Plan of Care Review  Recent Flowsheet Documentation  Taken 8/12/2024 0839 by Shameka Moon RN  Plan of Care Reviewed With: patient  Patient Agreement with Plan of Care: agrees  Goal: Adheres to Safety Considerations for Self and Others  Intervention: Develop and Maintain Individualized Safety Plan  Recent Flowsheet Documentation  Taken 8/12/2024 1200 by Shameka Moon, RN  Safety Measures:   environmental rounds completed   safety plan reviewed   safety rounds completed  Taken 8/12/2024 1000 by Shameka Moon RN  Safety Measures:   environmental rounds completed   safety plan reviewed   safety rounds completed  Taken 8/12/2024 0800 by Shameka Moon RN  Safety Measures:   environmental rounds completed   safety plan reviewed   safety rounds completed  Goal: Absence of New-Onset Illness or Injury  Intervention: Identify and Manage Fall Risk  Recent Flowsheet Documentation  Taken 8/12/2024 1200 by Shameka Moon RN  Safety Measures:   environmental rounds completed   safety plan reviewed   safety rounds completed  Taken 8/12/2024 1000 by Shameka Moon RN  Safety Measures:   environmental rounds completed   safety plan reviewed   safety rounds completed  Taken 8/12/2024 0800 by Shameka Moon RN  Safety Measures:   environmental rounds completed   safety plan reviewed   safety rounds completed  Goal: Optimized Coping Skills in Response to Life Stressors  Intervention: Promote Effective Coping Strategies  Recent Flowsheet Documentation  Taken 8/12/2024 0839 by Shameka Moon RN  Supportive Measures: active listening utilized  Goal: Develops/Participates in Therapeutic [place] to Support Successful Transition  Intervention: Foster Therapeutic [place]  Recent Flowsheet Documentation  Taken 8/12/2024 0839 by Shameka Moon RN  Trust Relationship/Rapport: care explained   Goal Outcome Evaluation:  Plan of Care  Reviewed With: patient  Patient Agreement with Plan of Care: agrees

## 2024-08-13 PROCEDURE — 63710000001 ONDANSETRON ODT 4 MG TABLET DISPERSIBLE: Performed by: PSYCHIATRY & NEUROLOGY

## 2024-08-13 PROCEDURE — 99232 SBSQ HOSP IP/OBS MODERATE 35: CPT | Performed by: PSYCHIATRY & NEUROLOGY

## 2024-08-13 RX ORDER — FOLIC ACID 1 MG/1
1 TABLET ORAL DAILY
Status: DISCONTINUED | OUTPATIENT
Start: 2024-08-13 | End: 2024-08-14 | Stop reason: HOSPADM

## 2024-08-13 RX ADMIN — TRAZODONE HYDROCHLORIDE 50 MG: 50 TABLET ORAL at 21:14

## 2024-08-13 RX ADMIN — Medication 2 TABLET: at 08:13

## 2024-08-13 RX ADMIN — HYDROXYZINE HYDROCHLORIDE 50 MG: 50 TABLET ORAL at 21:14

## 2024-08-13 RX ADMIN — ROPINIROLE HYDROCHLORIDE 0.5 MG: 1 TABLET, FILM COATED ORAL at 15:10

## 2024-08-13 RX ADMIN — Medication 100 MG: at 08:15

## 2024-08-13 RX ADMIN — SULFAMETHOXAZOLE AND TRIMETHOPRIM 1 TABLET: 800; 160 TABLET ORAL at 21:14

## 2024-08-13 RX ADMIN — SULFAMETHOXAZOLE AND TRIMETHOPRIM 1 TABLET: 800; 160 TABLET ORAL at 08:13

## 2024-08-13 RX ADMIN — BUPRENORPHINE HYDROCHLORIDE AND NALOXONE HYDROCHLORIDE DIHYDRATE 2 TABLET: 8; 2 TABLET SUBLINGUAL at 08:13

## 2024-08-13 RX ADMIN — ONDANSETRON 4 MG: 4 TABLET, ORALLY DISINTEGRATING ORAL at 18:05

## 2024-08-13 RX ADMIN — ONDANSETRON 4 MG: 4 TABLET, ORALLY DISINTEGRATING ORAL at 08:15

## 2024-08-13 RX ADMIN — LORAZEPAM 0.5 MG: 0.5 TABLET ORAL at 14:10

## 2024-08-13 RX ADMIN — ROPINIROLE HYDROCHLORIDE 0.5 MG: 1 TABLET, FILM COATED ORAL at 08:13

## 2024-08-13 RX ADMIN — LORAZEPAM 0.5 MG: 0.5 TABLET ORAL at 08:15

## 2024-08-13 RX ADMIN — ROPINIROLE HYDROCHLORIDE 0.5 MG: 1 TABLET, FILM COATED ORAL at 21:14

## 2024-08-13 RX ADMIN — HYDROXYZINE HYDROCHLORIDE 50 MG: 50 TABLET ORAL at 08:15

## 2024-08-13 RX ADMIN — LORAZEPAM 0.5 MG: 0.5 TABLET ORAL at 21:14

## 2024-08-13 RX ADMIN — Medication 1 TABLET: at 08:13

## 2024-08-13 RX ADMIN — FOLIC ACID 1 MG: 1 TABLET ORAL at 10:55

## 2024-08-13 NOTE — PROGRESS NOTES
"INPATIENT PSYCHIATRIC PROGRESS NOTE    Name:  Emilie Greenberg  :  1990  MRN:  [id number]  Visit Number:  [id number]  Length of stay:  4    SUBJECTIVE    CC/Focus of Exam: Alcohol and opioid dependence    INTERVAL HISTORY:  Patient reports continued but slow improvement, continues citing muscle cramps, restlessness, skin crawling, nausea, anxiety.  Sleep still a struggle.  Intake improving.  Tolerating medication.    Depression rating 3/10  Anxiety rating 7/10  Sleep: Fair, 6 hours with several interruptions  Withdrawal sx: Per HPI  Craving: 3/10    Review of Systems   Constitutional: Negative.    Respiratory: Negative.     Cardiovascular: Negative.    Gastrointestinal:  Positive for nausea.   Musculoskeletal:  Positive for myalgias.   Neurological: Negative.         Restless legs   Psychiatric/Behavioral:  Positive for dysphoric mood and sleep disturbance. The patient is nervous/anxious.        OBJECTIVE    Temp:  [97.1 °F (36.2 °C)-97.5 °F (36.4 °C)] 97.2 °F (36.2 °C)  Heart Rate:  [54-69] 62  Resp:  [16-18] 18  BP: ()/(57-94) 149/94    MENTAL STATUS EXAM:  Appearance: Casually dressed, fair hygeine.   Cooperation: Cooperative  Psychomotor: No psychomotor agitation/retardation, No EPS, No motor tics  Speech: normal rate, amount.  Mood: \"a little better\"   Affect: congruent, anxious  Thought Content: goal directed, no delusional material present  Thought process: linear, organized.  Suicidality: No SI  Homicidality: No HI  Perception: No AH/VH  Insight: fair   Judgment: fair    Lab Results (last 24 hours)       ** No results found for the last 24 hours. **               Imaging Results (Last 24 Hours)       ** No results found for the last 24 hours. **               ECG/EMG Results (most recent)       Procedure Component Value Units Date/Time    ECG 12 Lead QT Measurement [[id number]] Collected: 24 1104     Updated: 24 1611     QT Interval 492 ms      QTC Interval 466 ms     Narrative:      " Test Reason : QT Measurement  Blood Pressure :   */*   mmHG  Vent. Rate :  54 BPM     Atrial Rate :  54 BPM     P-R Int : 136 ms          QRS Dur :  86 ms      QT Int : 492 ms       P-R-T Axes :  18  54  59 degrees     QTc Int : 466 ms    Sinus bradycardia  Otherwise normal ECG  When compared with ECG of 09-AUG-2024 19:35,  No significant change was found  Confirmed by Jermaine Sánchez ([id number]) on 2024 4:11:01 PM    Referred By:            Confirmed By: Jermaine Sánchez             ALLERGIES: Patient has no known allergies.      Current Facility-Administered Medications:     aluminum-magnesium hydroxide-simethicone (MAALOX MAX) 400-400-40 MG/5ML suspension 15 mL, 15 mL, Oral, Q6H PRN, Rojelio Faust MD    B-complex with vitamin C tablet 2 tablet, 2 tablet, Oral, Daily, Rojelio Faust MD, 2 tablet at 24 0813    benzonatate (TESSALON) capsule 100 mg, 100 mg, Oral, TID PRN, Rojelio Faust MD    benztropine (COGENTIN) tablet 2 mg, 2 mg, Oral, Once PRN **OR** benztropine (COGENTIN) injection 1 mg, 1 mg, Intramuscular, Once PRN, Rojelio Faust MD    buprenorphine-naloxone (SUBOXONE) 8-2 MG per SL tablet 2 tablet, 2 tablet, Sublingual, Daily, Martina Faust MD, 2 tablet at 24 0813    famotidine (PEPCID) tablet 20 mg, 20 mg, Oral, BID PRN, Rojelio Faust MD    hydrOXYzine (ATARAX) tablet 50 mg, 50 mg, Oral, Q6H PRN, Rojelio Faust MD, 50 mg at 24 0815    ibuprofen (ADVIL,MOTRIN) tablet 400 mg, 400 mg, Oral, Q6H PRN, Rojelio Faust MD, 400 mg at 24 1440    loperamide (IMODIUM) capsule 2 mg, 2 mg, Oral, Q2H PRN, Rojelio Faust MD    [] LORazepam (ATIVAN) tablet 2 mg, 2 mg, Oral, 3 times per day, 2 mg at 08/10/24 2108 **FOLLOWED BY** [COMPLETED] LORazepam (ATIVAN) tablet 1.5 mg, 1.5 mg, Oral, 3 times per day, 1.5 mg at 24 **FOLLOWED BY** [COMPLETED] LORazepam (ATIVAN) tablet 1 mg, 1 mg, Oral, 3 times per day, 1 mg at 24 **FOLLOWED BY** LORazepam (ATIVAN) tablet 0.5  mg, 0.5 mg, Oral, 3 times per day, Rojelio Faust MD, 0.5 mg at 24 0815    [] LORazepam (ATIVAN) tablet 2 mg, 2 mg, Oral, Q1H PRN **FOLLOWED BY** [] LORazepam (ATIVAN) tablet 1.5 mg, 1.5 mg, Oral, Q1H PRN **FOLLOWED BY** [] LORazepam (ATIVAN) tablet 1 mg, 1 mg, Oral, Q1H PRN **FOLLOWED BY** LORazepam (ATIVAN) tablet 0.5 mg, 0.5 mg, Oral, Q1H PRN, Rojelio Faust MD    magnesium hydroxide (MILK OF MAGNESIA) suspension 10 mL, 10 mL, Oral, Daily PRN, Rojelio Faust MD    multivitamin with minerals 1 tablet, 1 tablet, Oral, Daily, Rojelio Faust MD, 1 tablet at 24    ondansetron ODT (ZOFRAN-ODT) disintegrating tablet 4 mg, 4 mg, Translingual, Q6H PRN, Rojelio Faust MD, 4 mg at 24    polyethylene glycol (MIRALAX) packet 17 g, 17 g, Oral, Daily PRN, Rojelio Faust MD    rOPINIRole (REQUIP) tablet 0.5 mg, 0.5 mg, Oral, TID, Alton Chairez MD, 0.5 mg at 24    sodium chloride nasal spray 2 spray, 2 spray, Each Nare, PRN, Rojelio Faust MD    sulfamethoxazole-trimethoprim (BACTRIM DS,SEPTRA DS) 800-160 MG per tablet 1 tablet, 1 tablet, Oral, Q12H, Alton Chairez MD, 1 tablet at 24    thiamine (VITAMIN B-1) tablet 100 mg, 100 mg, Oral, Daily, Rojelio Faust MD, 100 mg at 24 0815    traZODone (DESYREL) tablet 50 mg, 50 mg, Oral, Nightly PRN, Rojelio Faust MD, 50 mg at 24    Reviewed chart, notes, vitals, labs and EKG personally reviewed.    ASSESSMENT & PLAN:      Alcohol use disorder, severe, dependence, in withdrawal  -Admitted for medically assisted detox  -Continue Ativan detox protocol  -Supplementary vitamins and comfort meds ordered  -Started ropinirole 0.5 mg 3 times daily for significant restlessness  -We will encourage rehab or other intensive substance abuse treatment following discharge     Opioid use disorder, severe, dependence, on maintenance therapy  -Admission UDS positive for buprenorphine  -Continue Suboxone 16 mg  daily  -We will refer for substance abuse treatment following hospitalization     Prolonged Qtc  -506 on admission EKG  -Repeat EKG improved to 466     Microcytosis  -.3  -Reviewed vitamin B12, normal at 440, and folate, low at 3.04  -Begin folate supplementation  -Reviewed reticulocyte, percentage increased to 3.57, absolute normal.  -Patient not anemic or symptomatic at this time, so can be deferred to PCP     Urinary tract infection  -Urine culture growing gram-negative bacilli  -Started Bactrim DS twice daily for 3 days    Hospital bed: No     The patient has been admitted for safety and stabilization.  Patient will be monitored for suicidality daily and maintained on Special Precautions Level 4 (q30 min checks).  The patient will have individual and group therapy with a master's level therapist. A master treatment plan will be developed and agreed upon by the patient and his/her treatment team.  The patient's estimated length of stay in the hospital is 1-2 days.     Special precautions: Special Precautions Level 3 (q15 min checks)     Behavioral Health Treatment Plan and Problem List: I have reviewed and approved the Behavioral Health Treatment Plan and Problem list.  The patient has had a chance to review and agrees with the treatment plan.    I have reviewed the copied text and it is accurate as of 08/13/24     Clinician:  Alton Chairez MD  08/13/24  09:13 EDT

## 2024-08-13 NOTE — PLAN OF CARE
Goal Outcome Evaluation:        Problem: Adult Behavioral Health Plan of Care  Goal: Patient-Specific Goal (Individualization)  Outcome: Ongoing, Progressing  Flowsheets  Taken 8/10/2024 1301 by Radha Denise  Patient-Specific Goals (Include Timeframe): Patient will deny acute alcohol withdrawal symptoms in 1-7 days.  Patient will identify 1-2 healthy coping skills to use as a part of alcohol relapse prevention in 1-7 days.  Patient will consent to aftercare referral with [facility] in 1-7 days  Individualized Care Needs: Therapist will offer 1-4 individual sessions focusing on CBT coping skills and relapse prevention prior to discharge. Therapist will offer family education prior to discharge. Therapist will offer aftercare involving [facility] treatment prior to discharge.  Anxieties, Fears or Concerns: none reported  Taken 8/10/2024 1250 by Radha Denise  Patient Personal Strengths:   expressive of needs   expressive of emotions   motivated for treatment   realistic evaluation of current/future capabilities   resilient   resourceful   spiritual/[place] support   positive vocational history   positive educational history   community support   family/social support  Patient Vulnerabilities:   substance abuse/addiction   poor impulse control  Goal: Optimized Coping Skills in Response to Life Stressors  Outcome: Ongoing, Progressing  Flowsheets (Taken 8/12/2024 0923)  Optimized Coping Skills in Response to Life Stressors: making progress toward outcome  Intervention: Promote Effective Coping Strategies  Flowsheets (Taken 8/13/2024 0853)  Supportive Measures:   active listening utilized   counseling provided   decision-making supported   goal-setting facilitated   verbalization of feelings encouraged   self-responsibility promoted   self-reflection promoted   positive reinforcement provided   self-care encouraged  Goal: Develops/Participates in Therapeutic [place] to Support  Successful Transition  Outcome: Ongoing, Progressing  Flowsheets (Taken 8/12/2024 0923)  Develops/Participates in Therapeutic [place] to Support Successful Transition: making progress toward outcome  Intervention: Foster Therapeutic [place]  Flowsheets (Taken 8/13/2024 0853)  Trust Relationship/Rapport:   care explained   questions encouraged   choices provided   reassurance provided   emotional support provided   thoughts/feelings acknowledged   empathic listening provided   questions answered  Intervention: Mutually Develop Transition Plan  Flowsheets  Taken 8/13/2024 0852 by Kyung Jean CSW  Transportation Anticipated: family or friend will provide  Transportation Concerns: no car  Current Discharge Risk: substance use/abuse  Concerns to be Addressed:   substance/tobacco abuse/use   coping/stress   discharge planning  Readmission Within the Last 30 Days: no previous admission in last 30 days  Patient/Family Anticipated Services at Transition:   mental health services   outpatient care  Patient's Choice of Community Agency(s): [facility]  Patient/Family Anticipates Transition to: home with family  Offered/Gave Vendor List: no  Taken 8/12/2024 0921 by Kyung Jean CSW  Discharge Coordination/Progress: Patient has [employer]. Patient plans to return home at discharge and follow up outpatient with [facility]. Family to provide transportation.  Taken 8/10/2024 1301 by Radha Denise  Outpatient/Agency/Support Group Needs: outpatient substance abuse treatment (specify)  Transition Support:   community resources reviewed   follow-up care coordinated   follow-up care discussed   crisis management plan promoted   crisis management plan verbalized  Anticipated Discharge Disposition: home with family  Taken 8/10/2024 1259 by Radha Denise  Concerns Comments: NA      DATA:  Therapist met with Patient individually this date. Patient agreeable to discuss current treatment  progress and discharge concerns.     CLINICAL MANUVERING/INTERVENTIONS:    Assisted Patient in processing session content; acknowledged and normalized Patient’s thoughts, feelings, and concerns by utilizing a person-centered approach in efforts to build appropriate rapport and a positive therapeutic relationship with open and honest communication. Allowed Patient to ventilate regarding current stressors and triggers for negative emotions and thoughts in a safe nonjudgmental environment with unconditional positive regard, active listening skills, and empathy. Therapist implemented motivational interviewing techniques to assist Patient with exploring personal growth and change and discussed distress tolerance skills, self soothing techniques, and applied cognitive behavioral strategies to facilitate identification of maladaptive patterns of thinking and behavior.Therapist utilized dialectical behavior techniques to teach and model emotional regulation and relaxation methods. Therapist assisted Patient with identifying and implementing healthier coping strategies.    ASSESSMENT:  Therapist met with patient on this date. She continues to receive treatment for detox and expects be stable for discharge tomorrow. Patient reports high anxiety and mild depression, denies current SI/HI/AVH. Patient is experiencing restlessness, restless legs at night, muscle cramps, nausea, and skin crawling sensation. Patient plans to return home at discharge and has been scheduled with [facility] in [place] for aftercare. Family is expected to provide transportation at time of discharge.     PLAN:   Patient will continue stabilization. Patient will continue to receive services offered by Treatment Team.     Therapist recommends [facility] residential rehab. Patient plans to return home and follow up outpatient with [facility] in [place].

## 2024-08-13 NOTE — PLAN OF CARE
Goal Outcome Evaluation:  Plan of Care Reviewed With: patient  Patient Agreement with Plan of Care: agrees     Progress: improving  Outcome Evaluation: Pt reports feeling better this shift. C/o mild nausea this AM but reports symptom improvement overall. Denies SI/HI/AVH. No distress noted.

## 2024-08-13 NOTE — PLAN OF CARE
Goal Outcome Evaluation:  Plan of Care Reviewed With: patient  Patient Agreement with Plan of Care: agrees     Progress: improving  Outcome Evaluation: Pt reports good sleep and appetite and denies SI/HI/AVH. Pt spent more time out of her room this shift than previous day.    Pt appeared to sleep the majority of the night, approximately 6 hours this shift.

## 2024-08-14 VITALS
BODY MASS INDEX: 30.8 KG/M2 | SYSTOLIC BLOOD PRESSURE: 120 MMHG | TEMPERATURE: 97.5 F | RESPIRATION RATE: 18 BRPM | HEIGHT: 62 IN | OXYGEN SATURATION: 100 % | DIASTOLIC BLOOD PRESSURE: 73 MMHG | HEART RATE: 66 BPM | WEIGHT: 167.4 LBS

## 2024-08-14 PROBLEM — F10.21 ALCOHOL USE DISORDER, SEVERE, IN EARLY REMISSION, DEPENDENCE: Status: ACTIVE | Noted: 2024-08-09

## 2024-08-14 PROCEDURE — 63710000001 ONDANSETRON ODT 4 MG TABLET DISPERSIBLE: Performed by: PSYCHIATRY & NEUROLOGY

## 2024-08-14 PROCEDURE — 99238 HOSP IP/OBS DSCHRG MGMT 30/<: CPT | Performed by: PSYCHIATRY & NEUROLOGY

## 2024-08-14 RX ORDER — ROPINIROLE 0.5 MG/1
0.5 TABLET, FILM COATED ORAL 3 TIMES DAILY
Qty: 90 TABLET | Refills: 0 | Status: SHIPPED | OUTPATIENT
Start: 2024-08-14

## 2024-08-14 RX ADMIN — BUPRENORPHINE HYDROCHLORIDE AND NALOXONE HYDROCHLORIDE DIHYDRATE 2 TABLET: 8; 2 TABLET SUBLINGUAL at 08:25

## 2024-08-14 RX ADMIN — ONDANSETRON 4 MG: 4 TABLET, ORALLY DISINTEGRATING ORAL at 08:53

## 2024-08-14 RX ADMIN — HYDROXYZINE HYDROCHLORIDE 50 MG: 50 TABLET ORAL at 08:26

## 2024-08-14 RX ADMIN — Medication 100 MG: at 08:25

## 2024-08-14 RX ADMIN — ROPINIROLE HYDROCHLORIDE 0.5 MG: 1 TABLET, FILM COATED ORAL at 15:05

## 2024-08-14 RX ADMIN — ROPINIROLE HYDROCHLORIDE 0.5 MG: 1 TABLET, FILM COATED ORAL at 08:25

## 2024-08-14 RX ADMIN — Medication 2 TABLET: at 08:26

## 2024-08-14 RX ADMIN — IBUPROFEN 400 MG: 400 TABLET, FILM COATED ORAL at 08:25

## 2024-08-14 RX ADMIN — FOLIC ACID 1 MG: 1 TABLET ORAL at 08:25

## 2024-08-14 RX ADMIN — Medication 1 TABLET: at 08:25

## 2024-08-14 NOTE — CASE MANAGEMENT/SOCIAL WORK
Patient Name:  Emilie Greenberg  YOB: 1990  MRN: [id number]  Admit Date:  8/9/2024    Patient expected to discharge home on this date, family to provide transportation. Patient reports improvement in mood and withdrawal symptoms, denies SI/HI/AVH. Patient is scheduled with [facility] for outpatient aftercare. Healthy coping skills and relapse prevention have been reviewed. Patient has been assisted with identifying risk factors which would indicate the need for higher level of care including thoughts to harm self or others and/or self-harming behavior. Encouraged patient to call [phone] or present to the nearest emergency room should any of these events occur. No other needs identified.     Electronically signed by:  ZAYRA Navarro  08/14/24 09:11 EDT

## 2024-08-14 NOTE — DISCHARGE SUMMARY
":  1990  MRN:  [id number]  Visit Number:  [id number]      Date of Admission:2024   Date of Discharge:  2024    Discharge Diagnosis:  Principal Problem:    Alcohol use disorder, severe, in early remission, dependence    Opioid use disorder severe on maintenance    RLS      Admission Diagnosis:  Chemical dependency [F19.20]     WOODY Greenberg is a 34 y.o. female who was admitted on 2024 with complaints of alcohol use and withdrawals.   For details please see H&P dated 8/10/24.    Hospital Course  Patient is a 34 y.o. female presented with alcohol use and withdrawals.  The patient was admitted to the [facility] detox recovery unit for safety, further evaluation and treatment.  The patient was started on Ativan detox and she was able to complete it without any adverse events. She was continued on MAT with buprenorphine. She reported symptoms of RLS and was started on ropinirole and she she reported it helped.   The patient was also able to take part in individual and group counseling sessions and work on appropriate coping skills.  The patient made steady improvement in her withdrawals and mood and expressed feeling more positive and hopeful about future. Sleep and appetite were improved.  The day of discharge the patient was calm, cooperative and pleasant. Mood was reported to be good, and denied SI/HI/AVH. Also reported no medication side effects.        Mental Status Exam upon discharge:   Mood \"good\"   Affect-congruent, appropriate, stable  Thought Content-goal directed, no delusional material present  Thought process-linear, organized.  Suicidality: No SI  Homicidality: No HI  Perception: No AH/VH    Procedures Performed         Consults:   Consults       No orders found from 2024 to 8/10/2024.            Pertinent Test Results:   Admission on 2024   Component Date Value Ref Range Status    Hepatitis B Surface Ag 08/10/2024 Non-Reactive  Non-Reactive Final    Hep A IgM " 08/10/2024 Non-Reactive  Non-Reactive Final    Hep B C IgM 08/10/2024 Non-Reactive  Non-Reactive Final    Hepatitis C Ab 08/10/2024 Non-Reactive  Non-Reactive Final    Reticulocyte % 08/10/2024 3.57 (H)  0.70 - 1.90 % Final    Reticulocyte Absolute 08/10/2024 0.0946  0.0200 - 0.1300 10*6/mm3 Final    Vitamin B-12 08/10/2024 440  211 - 946 pg/mL Final    Folate 08/10/2024 3.04 (L)  4.78 - 24.20 ng/mL Final    QT Interval 08/11/2024 492  ms Final    QTC Interval 08/11/2024 466  ms Final   Admission on 08/09/2024, Discharged on 08/09/2024   Component Date Value Ref Range Status    Glucose 08/09/2024 118 (H)  65 - 99 mg/dL Final    BUN 08/09/2024 9  6 - 20 mg/dL Final    Creatinine 08/09/2024 0.48 (L)  0.57 - 1.00 mg/dL Final    Sodium 08/09/2024 144  136 - 145 mmol/L Final    Potassium 08/09/2024 3.3 (L)  3.5 - 5.2 mmol/L Final    Chloride 08/09/2024 105  98 - 107 mmol/L Final    CO2 08/09/2024 23.0  22.0 - 29.0 mmol/L Final    Calcium 08/09/2024 8.6  8.6 - 10.5 mg/dL Final    Total Protein 08/09/2024 7.8  6.0 - 8.5 g/dL Final    Albumin 08/09/2024 4.2  3.5 - 5.2 g/dL Final    ALT (SGPT) 08/09/2024 22  1 - 33 U/L Final    AST (SGOT) 08/09/2024 61 (H)  1 - 32 U/L Final    Alkaline Phosphatase 08/09/2024 114  39 - 117 U/L Final    Total Bilirubin 08/09/2024 0.8  0.0 - 1.2 mg/dL Final    Globulin 08/09/2024 3.6  gm/dL Final    A/G Ratio 08/09/2024 1.2  g/dL Final    BUN/Creatinine Ratio 08/09/2024 18.8  7.0 - 25.0 Final    Anion Gap 08/09/2024 16.0 (H)  5.0 - 15.0 mmol/L Final    eGFR 08/09/2024 126.1  >60.0 mL/min/1.73 Final    HCG, Urine QL 08/09/2024 Negative  Negative Final    Color, UA 08/09/2024 Dark Yellow (A)  Yellow, Straw Final    Appearance, UA 08/09/2024 Clear  Clear Final    pH, UA 08/09/2024 6.0  5.0 - 8.0 Final    Specific Gravity, UA 08/09/2024 1.023  1.005 - 1.030 Final    Glucose, UA 08/09/2024 Negative  Negative Final    Ketones, UA 08/09/2024 Negative  Negative Final    Bilirubin, UA 08/09/2024  Negative  Negative Final    Blood, UA 08/09/2024 Negative  Negative Final    Protein, UA 08/09/2024 Trace (A)  Negative Final    Leuk Esterase, UA 08/09/2024 Trace (A)  Negative Final    Nitrite, UA 08/09/2024 Negative  Negative Final    Urobilinogen, UA 08/09/2024 1.0 E.U./dL  0.2 - 1.0 E.U./dL Final    Ethanol 08/09/2024 244 (H)  0 - 10 mg/dL Final    Ethanol % 08/09/2024 0.244  % Final    THC, Screen, Urine 08/09/2024 Negative  Negative Final    Phencyclidine (PCP), Urine 08/09/2024 Negative  Negative Final    Cocaine Screen, Urine 08/09/2024 Negative  Negative Final    Methamphetamine, Ur 08/09/2024 Negative  Negative Final    Opiate Screen 08/09/2024 Negative  Negative Final    Amphetamine Screen, Urine 08/09/2024 Negative  Negative Final    Benzodiazepine Screen, Urine 08/09/2024 Negative  Negative Final    Tricyclic Antidepressants Screen 08/09/2024 Negative  Negative Final    Methadone Screen, Urine 08/09/2024 Negative  Negative Final    Barbiturates Screen, Urine 08/09/2024 Negative  Negative Final    Oxycodone Screen, Urine 08/09/2024 Negative  Negative Final    Buprenorphine, Screen, Urine 08/09/2024 Positive (A)  Negative Final    Magnesium 08/09/2024 2.1  1.6 - 2.6 mg/dL Final    WBC 08/09/2024 6.31  3.40 - 10.80 10*3/mm3 Final    RBC 08/09/2024 3.15 (L)  3.77 - 5.28 10*6/mm3 Final    Hemoglobin 08/09/2024 12.6  12.0 - 15.9 g/dL Final    Hematocrit 08/09/2024 36.0  34.0 - 46.6 % Final    MCV 08/09/2024 114.3 (H)  79.0 - 97.0 fL Final    MCH 08/09/2024 40.0 (H)  26.6 - 33.0 pg Final    MCHC 08/09/2024 35.0  31.5 - 35.7 g/dL Final    RDW 08/09/2024 15.0  12.3 - 15.4 % Final    RDW-SD 08/09/2024 63.7 (H)  37.0 - 54.0 fl Final    MPV 08/09/2024 10.7  6.0 - 12.0 fL Final    Platelets 08/09/2024 140  140 - 450 10*3/mm3 Final    Neutrophil % 08/09/2024 58.4  42.7 - 76.0 % Final    Lymphocyte % 08/09/2024 33.0  19.6 - 45.3 % Final    Monocyte % 08/09/2024 6.8  5.0 - 12.0 % Final    Eosinophil % 08/09/2024 1.1   0.3 - 6.2 % Final    Basophil % 08/09/2024 0.5  0.0 - 1.5 % Final    Immature Grans % 08/09/2024 0.2  0.0 - 0.5 % Final    Neutrophils, Absolute 08/09/2024 3.69  1.70 - 7.00 10*3/mm3 Final    Lymphocytes, Absolute 08/09/2024 2.08  0.70 - 3.10 10*3/mm3 Final    Monocytes, Absolute 08/09/2024 0.43  0.10 - 0.90 10*3/mm3 Final    Eosinophils, Absolute 08/09/2024 0.07  0.00 - 0.40 10*3/mm3 Final    Basophils, Absolute 08/09/2024 0.03  0.00 - 0.20 10*3/mm3 Final    Immature Grans, Absolute 08/09/2024 0.01  0.00 - 0.05 10*3/mm3 Final    nRBC 08/09/2024 0.0  0.0 - 0.2 /100 WBC Final    Extra Tube 08/09/2024 Hold for add-ons.   Final    Auto resulted.    Extra Tube 08/09/2024 hold for add-on   Final    Auto resulted    Extra Tube 08/09/2024 Hold for add-ons.   Final    Auto resulted.    Extra Tube 08/09/2024 Hold for add-ons.   Final    Auto resulted    Fentanyl, Urine 08/09/2024 Negative  Negative Final    RBC, UA 08/09/2024 3-5 (A)  None Seen, 0-2 /HPF Final    WBC, UA 08/09/2024 3-5 (A)  None Seen, 0-2 /HPF Final    Bacteria, UA 08/09/2024 4+ (A)  None Seen /HPF Final    Squamous Epithelial Cells, UA 08/09/2024 0-2  None Seen, 0-2 /HPF Final    Hyaline Casts, UA 08/09/2024 None Seen  None Seen /LPF Final    Methodology 08/09/2024 Automated Microscopy   Final    Urine Culture 08/09/2024 >100,000 CFU/mL Enterobacter cloacae complex (A)   Final    Comment:                              This organism may develop resistance during prolonged therapy with 3rd generation cephalosporins (e.g. ceftriaxone) as a result of de-repression of AmpC B-lactamase.  Ceftriaxone may be a reasonable treatment option for uncomplicated cystitis or other                            lower severity infections when susceptibility is demonstrated.    Ethanol 08/09/2024 67 (H)  0 - 10 mg/dL Final    Ethanol % 08/09/2024 0.067  % Final    QT Interval 08/09/2024 450  ms Final    QTC Interval 08/09/2024 506  ms Final        Condition on Discharge:   improved    Vital Signs  Temp:  [96.9 °F (36.1 °C)-98.5 °F (36.9 °C)] 96.9 °F (36.1 °C)  Heart Rate:  [57-78] 57  Resp:  [16-18] 16  BP: (110-150)/(59-85) 114/72      Discharge Disposition:  Home or Self Care    Discharge Medications:     Discharge Medications        New Medications        Instructions Start Date   rOPINIRole 0.5 MG tablet  Commonly known as: REQUIP   0.5 mg, Oral, 3 Times Daily, Take 1 hour before bedtime.             Continue These Medications        Instructions Start Date   buprenorphine-naloxone 8-2 MG per SL tablet  Commonly known as: SUBOXONE   2 tablets, Sublingual, Daily               Discharge Diet: Regular     Activity at Discharge: As tolerated     Follow-up Appointments    [address] [address] [phone]     August 15 2024 at 1:30pm      Time: I spent  < 30  minutes on this discharge activity which included: face-to-face encounter with the patient, reviewing the data in the system, coordination of the care with the nursing staff as well as consultants, documentation, and entering orders.        Clinician:   Tracey Dowd MD  08/14/24  10:16 EDT

## 2024-08-14 NOTE — PAYOR COMM NOTE
"Emilie Posada (34 y.o. Female)       Date of Birth   1990    Social Security Number       Address   [address] [place] [id number]    Home Phone   [phone]    MRN   [id number]       [facility]    Marital Status   Single                            Admission Date   24    Admission Type   Emergency    Admitting Provider   Rojelio Faust MD    Attending Provider   Tracey Dowd MD    Department, Room/Bed   [facility] ADULT CD, 1039/2S       Discharge Date       Discharge Disposition   Home or Self Care    Discharge Destination                                 Attending Provider: Tracey Dowd MD    Allergies: No Known Allergies    Isolation: None   Infection: None   Code Status: CPR    Ht: 157.5 cm (62\")   Wt: 75.9 kg (167 lb 6.4 oz)    Admission Cmt: None   Principal Problem: Alcohol use disorder, severe, in early remission, dependence [F10.21]                   Active Insurance as of 2024       Primary Coverage       Payor Plan Insurance Group Employer/Plan Group    [place] [id number]       Payor Plan Address Payor Plan Phone Number Payor Plan Fax Number Effective Dates    [employer] [address] [address] [phone]  2024 - None Entered    [place] [address]         Subscriber Name Subscriber Birth Date Member ID       EMILIE POSADA 1990 [id number]                     Emergency Contacts        (Rel.) Home Phone Work Phone Mobile Phone    NICOLÁS PIKE (Significant Other) [phone] -- --                 Discharge Summary        Tracey Dowd MD at 24 1015          :  1990  MRN:  [id number]  Visit Number:  [id number]      Date of Admission:2024   Date of Discharge:  2024    Discharge Diagnosis:  Principal Problem:    Alcohol use disorder, severe, in early remission, dependence    Opioid use disorder severe on maintenance    RLS      Admission Diagnosis:  Chemical dependency " "[F19.20]     HPI  Emilie Greenberg is a 34 y.o. female who was admitted on 8/9/2024 with complaints of alcohol use and withdrawals.   For details please see H&P dated 8/10/24.    Hospital Course  Patient is a 34 y.o. female presented with alcohol use and withdrawals.  The patient was admitted to the [facility] detox recovery unit for safety, further evaluation and treatment.  The patient was started on Ativan detox and she was able to complete it without any adverse events. She was continued on MAT with buprenorphine. She reported symptoms of RLS and was started on ropinirole and she she reported it helped.   The patient was also able to take part in individual and group counseling sessions and work on appropriate coping skills.  The patient made steady improvement in her withdrawals and mood and expressed feeling more positive and hopeful about future. Sleep and appetite were improved.  The day of discharge the patient was calm, cooperative and pleasant. Mood was reported to be good, and denied SI/HI/AVH. Also reported no medication side effects.        Mental Status Exam upon discharge:   Mood \"good\"   Affect-congruent, appropriate, stable  Thought Content-goal directed, no delusional material present  Thought process-linear, organized.  Suicidality: No SI  Homicidality: No HI  Perception: No AH/VH    Procedures Performed         Consults:   Consults       No orders found from 7/11/2024 to 8/10/2024.            Pertinent Test Results:   Admission on 08/09/2024   Component Date Value Ref Range Status    Hepatitis B Surface Ag 08/10/2024 Non-Reactive  Non-Reactive Final    Hep A IgM 08/10/2024 Non-Reactive  Non-Reactive Final    Hep B C IgM 08/10/2024 Non-Reactive  Non-Reactive Final    Hepatitis C Ab 08/10/2024 Non-Reactive  Non-Reactive Final    Reticulocyte % 08/10/2024 3.57 (H)  0.70 - 1.90 % Final    Reticulocyte Absolute 08/10/2024 0.0946  0.0200 - 0.1300 10*6/mm3 Final    Vitamin B-12 08/10/2024 440  211 - " 946 pg/mL Final    Folate 08/10/2024 3.04 (L)  4.78 - 24.20 ng/mL Final    QT Interval 08/11/2024 492  ms Final    QTC Interval 08/11/2024 466  ms Final   Admission on 08/09/2024, Discharged on 08/09/2024   Component Date Value Ref Range Status    Glucose 08/09/2024 118 (H)  65 - 99 mg/dL Final    BUN 08/09/2024 9  6 - 20 mg/dL Final    Creatinine 08/09/2024 0.48 (L)  0.57 - 1.00 mg/dL Final    Sodium 08/09/2024 144  136 - 145 mmol/L Final    Potassium 08/09/2024 3.3 (L)  3.5 - 5.2 mmol/L Final    Chloride 08/09/2024 105  98 - 107 mmol/L Final    CO2 08/09/2024 23.0  22.0 - 29.0 mmol/L Final    Calcium 08/09/2024 8.6  8.6 - 10.5 mg/dL Final    Total Protein 08/09/2024 7.8  6.0 - 8.5 g/dL Final    Albumin 08/09/2024 4.2  3.5 - 5.2 g/dL Final    ALT (SGPT) 08/09/2024 22  1 - 33 U/L Final    AST (SGOT) 08/09/2024 61 (H)  1 - 32 U/L Final    Alkaline Phosphatase 08/09/2024 114  39 - 117 U/L Final    Total Bilirubin 08/09/2024 0.8  0.0 - 1.2 mg/dL Final    Globulin 08/09/2024 3.6  gm/dL Final    A/G Ratio 08/09/2024 1.2  g/dL Final    BUN/Creatinine Ratio 08/09/2024 18.8  7.0 - 25.0 Final    Anion Gap 08/09/2024 16.0 (H)  5.0 - 15.0 mmol/L Final    eGFR 08/09/2024 126.1  >60.0 mL/min/1.73 Final    HCG, Urine QL 08/09/2024 Negative  Negative Final    Color, UA 08/09/2024 Dark Yellow (A)  Yellow, Straw Final    Appearance, UA 08/09/2024 Clear  Clear Final    pH, UA 08/09/2024 6.0  5.0 - 8.0 Final    Specific Gravity, UA 08/09/2024 1.023  1.005 - 1.030 Final    Glucose, UA 08/09/2024 Negative  Negative Final    Ketones, UA 08/09/2024 Negative  Negative Final    Bilirubin, UA 08/09/2024 Negative  Negative Final    Blood, UA 08/09/2024 Negative  Negative Final    Protein, UA 08/09/2024 Trace (A)  Negative Final    Leuk Esterase, UA 08/09/2024 Trace (A)  Negative Final    Nitrite, UA 08/09/2024 Negative  Negative Final    Urobilinogen, UA 08/09/2024 1.0 E.U./dL  0.2 - 1.0 E.U./dL Final    Ethanol 08/09/2024 244 (H)  0 - 10  mg/dL Final    Ethanol % 08/09/2024 0.244  % Final    THC, Screen, Urine 08/09/2024 Negative  Negative Final    Phencyclidine (PCP), Urine 08/09/2024 Negative  Negative Final    Cocaine Screen, Urine 08/09/2024 Negative  Negative Final    Methamphetamine, Ur 08/09/2024 Negative  Negative Final    Opiate Screen 08/09/2024 Negative  Negative Final    Amphetamine Screen, Urine 08/09/2024 Negative  Negative Final    Benzodiazepine Screen, Urine 08/09/2024 Negative  Negative Final    Tricyclic Antidepressants Screen 08/09/2024 Negative  Negative Final    Methadone Screen, Urine 08/09/2024 Negative  Negative Final    Barbiturates Screen, Urine 08/09/2024 Negative  Negative Final    Oxycodone Screen, Urine 08/09/2024 Negative  Negative Final    Buprenorphine, Screen, Urine 08/09/2024 Positive (A)  Negative Final    Magnesium 08/09/2024 2.1  1.6 - 2.6 mg/dL Final    WBC 08/09/2024 6.31  3.40 - 10.80 10*3/mm3 Final    RBC 08/09/2024 3.15 (L)  3.77 - 5.28 10*6/mm3 Final    Hemoglobin 08/09/2024 12.6  12.0 - 15.9 g/dL Final    Hematocrit 08/09/2024 36.0  34.0 - 46.6 % Final    MCV 08/09/2024 114.3 (H)  79.0 - 97.0 fL Final    MCH 08/09/2024 40.0 (H)  26.6 - 33.0 pg Final    MCHC 08/09/2024 35.0  31.5 - 35.7 g/dL Final    RDW 08/09/2024 15.0  12.3 - 15.4 % Final    RDW-SD 08/09/2024 63.7 (H)  37.0 - 54.0 fl Final    MPV 08/09/2024 10.7  6.0 - 12.0 fL Final    Platelets 08/09/2024 140  140 - 450 10*3/mm3 Final    Neutrophil % 08/09/2024 58.4  42.7 - 76.0 % Final    Lymphocyte % 08/09/2024 33.0  19.6 - 45.3 % Final    Monocyte % 08/09/2024 6.8  5.0 - 12.0 % Final    Eosinophil % 08/09/2024 1.1  0.3 - 6.2 % Final    Basophil % 08/09/2024 0.5  0.0 - 1.5 % Final    Immature Grans % 08/09/2024 0.2  0.0 - 0.5 % Final    Neutrophils, Absolute 08/09/2024 3.69  1.70 - 7.00 10*3/mm3 Final    Lymphocytes, Absolute 08/09/2024 2.08  0.70 - 3.10 10*3/mm3 Final    Monocytes, Absolute 08/09/2024 0.43  0.10 - 0.90 10*3/mm3 Final    Eosinophils,  Absolute 08/09/2024 0.07  0.00 - 0.40 10*3/mm3 Final    Basophils, Absolute 08/09/2024 0.03  0.00 - 0.20 10*3/mm3 Final    Immature Grans, Absolute 08/09/2024 0.01  0.00 - 0.05 10*3/mm3 Final    nRBC 08/09/2024 0.0  0.0 - 0.2 /100 WBC Final    Extra Tube 08/09/2024 Hold for add-ons.   Final    Auto resulted.    Extra Tube 08/09/2024 hold for add-on   Final    Auto resulted    Extra Tube 08/09/2024 Hold for add-ons.   Final    Auto resulted.    Extra Tube 08/09/2024 Hold for add-ons.   Final    Auto resulted    Fentanyl, Urine 08/09/2024 Negative  Negative Final    RBC, UA 08/09/2024 3-5 (A)  None Seen, 0-2 /HPF Final    WBC, UA 08/09/2024 3-5 (A)  None Seen, 0-2 /HPF Final    Bacteria, UA 08/09/2024 4+ (A)  None Seen /HPF Final    Squamous Epithelial Cells, UA 08/09/2024 0-2  None Seen, 0-2 /HPF Final    Hyaline Casts, UA 08/09/2024 None Seen  None Seen /LPF Final    Methodology 08/09/2024 Automated Microscopy   Final    Urine Culture 08/09/2024 >100,000 CFU/mL Enterobacter cloacae complex (A)   Final    Comment:                              This organism may develop resistance during prolonged therapy with 3rd generation cephalosporins (e.g. ceftriaxone) as a result of de-repression of AmpC B-lactamase.  Ceftriaxone may be a reasonable treatment option for uncomplicated cystitis or other                            lower severity infections when susceptibility is demonstrated.    Ethanol 08/09/2024 67 (H)  0 - 10 mg/dL Final    Ethanol % 08/09/2024 0.067  % Final    QT Interval 08/09/2024 450  ms Final    QTC Interval 08/09/2024 506  ms Final        Condition on Discharge:  improved    Vital Signs  Temp:  [96.9 °F (36.1 °C)-98.5 °F (36.9 °C)] 96.9 °F (36.1 °C)  Heart Rate:  [57-78] 57  Resp:  [16-18] 16  BP: (110-150)/(59-85) 114/72      Discharge Disposition:  Home or Self Care    Discharge Medications:     Discharge Medications        New Medications        Instructions Start Date   rOPINIRole 0.5 MG  tablet  Commonly known as: REQUIP   0.5 mg, Oral, 3 Times Daily, Take 1 hour before bedtime.             Continue These Medications        Instructions Start Date   buprenorphine-naloxone 8-2 MG per SL tablet  Commonly known as: SUBOXONE   2 tablets, Sublingual, Daily               Discharge Diet: Regular     Activity at Discharge: As tolerated     Follow-up Appointments    [address] [address]  [phone]     August 15 2024 at 1:30pm      Time: I spent  < 30  minutes on this discharge activity which included: face-to-face encounter with the patient, reviewing the data in the system, coordination of the care with the nursing staff as well as consultants, documentation, and entering orders.        Clinician:   Tracey Dowd MD  08/14/24  10:16 EDT    Electronically signed by Tracey Dowd MD at 08/14/24 [id number]

## 2024-08-14 NOTE — PLAN OF CARE
Goal Outcome Evaluation:  Plan of Care Reviewed With: patient  Patient Agreement with Plan of Care: agrees     Progress: improving  Outcome Evaluation: Pt stable and being discharged home.

## 2024-08-14 NOTE — PLAN OF CARE
Goal Outcome Evaluation:  Plan of Care Reviewed With: patient  Patient Agreement with Plan of Care: agrees     Progress: improving  Outcome Evaluation: Pt reports anx and dep Denies SI/HI/AVH States she was able to eat snack although it gave her some stomach cramping Cravings rated 6 Interacts well with peers and staff